# Patient Record
Sex: MALE | Race: BLACK OR AFRICAN AMERICAN | NOT HISPANIC OR LATINO | Employment: PART TIME | URBAN - METROPOLITAN AREA
[De-identification: names, ages, dates, MRNs, and addresses within clinical notes are randomized per-mention and may not be internally consistent; named-entity substitution may affect disease eponyms.]

---

## 2019-09-08 ENCOUNTER — HOSPITAL ENCOUNTER (EMERGENCY)
Facility: HOSPITAL | Age: 54
Discharge: HOME | End: 2019-09-08
Attending: EMERGENCY MEDICINE | Admitting: EMERGENCY MEDICINE
Payer: COMMERCIAL

## 2019-09-08 ENCOUNTER — APPOINTMENT (EMERGENCY)
Dept: CT IMAGING | Facility: HOSPITAL | Age: 54
End: 2019-09-08
Payer: COMMERCIAL

## 2019-09-08 VITALS
TEMPERATURE: 98 F | HEART RATE: 85 BPM | WEIGHT: 231.48 LBS | RESPIRATION RATE: 20 BRPM | BODY MASS INDEX: 27.1 KG/M2 | SYSTOLIC BLOOD PRESSURE: 138 MMHG | OXYGEN SATURATION: 98 % | DIASTOLIC BLOOD PRESSURE: 86 MMHG
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
## 2019-09-08 DIAGNOSIS — S16.1XXA STRAIN OF NECK MUSCLE, INITIAL ENCOUNTER: ICD-10-CM

## 2019-09-08 DIAGNOSIS — S09.90XA INJURY OF HEAD, INITIAL ENCOUNTER: ICD-10-CM

## 2019-09-08 PROCEDURE — 99284 EMERGENCY DEPT VISIT MOD MDM: CPT

## 2019-09-08 PROCEDURE — 70450 CT HEAD/BRAIN W/O DYE: CPT

## 2019-09-08 PROCEDURE — 99283 EMERGENCY DEPT VISIT LOW MDM: CPT | Performed by: EMERGENCY MEDICINE

## 2021-01-28 ENCOUNTER — DOCUMENTATION (OUTPATIENT)
Dept: AUDIOLOGY | Age: 56
End: 2021-01-28

## 2021-01-28 NOTE — PROGRESS NOTES
Progress Note    Name:  Rosanna Smyth  :  1965  Age:  63 y o    Date of Evaluation: 21     OVR PO received       Ardi Gilliland   Clinical Audiologist

## 2021-03-10 DIAGNOSIS — Z23 ENCOUNTER FOR IMMUNIZATION: ICD-10-CM

## 2021-03-24 ENCOUNTER — OFFICE VISIT (OUTPATIENT)
Dept: AUDIOLOGY | Age: 56
End: 2021-03-24
Payer: COMMERCIAL

## 2021-03-24 DIAGNOSIS — H90.3 SENSORY HEARING LOSS, BILATERAL: Primary | ICD-10-CM

## 2021-03-24 PROCEDURE — 92557 COMPREHENSIVE HEARING TEST: CPT | Performed by: AUDIOLOGIST-HEARING AID FITTER

## 2021-03-24 PROCEDURE — 92591 HB HEARING AID EXAM BOTH EARS: CPT | Performed by: AUDIOLOGIST-HEARING AID FITTER

## 2021-03-24 PROCEDURE — 92567 TYMPANOMETRY: CPT | Performed by: AUDIOLOGIST-HEARING AID FITTER

## 2021-03-24 NOTE — PROGRESS NOTES
HEARING EVALUATION    Name:  Vikki Johnson  :  1965  Age:  63 y o  Date of Evaluation: 21     History: Known Hearing Loss in the left ear only  Reason for visit: Vikki Johnson is being seen today at the request of Dr Zeyad Raphael for an evaluation of hearing  Patient reports a known significant hearing loss in his left ear that was caused by "vertigo" in  or   Bentley Helm reported he wore a hearing aid in his left ear starting in  but unfortunately lost in in   Denied tinnitus, aural fullness, and ear pain  EVALUATION:    Otoscopic Evaluation:   Right Ear: Clear and healthy ear canal and tympanic membrane   Left Ear: Clear and healthy ear canal and tympanic membrane    Tympanometry:   Right: Type A - normal middle ear pressure and compliance   Left: Type A - normal middle ear pressure and compliance    Audiogram Results:  Pure tone testing revealed a profound sensorineural hearing loss in the left ear and normal hearing sensitivity in the right ear  SRT and PTA are in agreement indicating good test reliability  Word discrimination ability was excellent in the right ear and very poor in the left ear (0%0  *see attached audiogram      RECOMMENDATIONS:  Annual hearing eval, Return to [facility]  for F/U, Medical Clearance and Copy to Patient/Caregiver    PATIENT EDUCATION:   Discussed results and recommendations with Ed  See HAE  Questions were addressed and the patient was encouraged to contact our department should concerns arise        Adri Hernandez   Clinical Audiologist

## 2021-03-24 NOTE — PROGRESS NOTES
Progress Note    Name:  Oscar Weinstein  :  1965  Age:  63 y o  Date of Evaluation: 21     Kaden Etienne was seen for an OVR HAE  Kaden Etienne reported he was previously wearing a custom hearing aid with all components inside of the ear, likely a CIC or IIC  Counseling was provided on the limitation of this type of hearing aid in regards to the severity of his hearing loss  Discussed options available such as a power BTE for sound awareness or a CROS system  Literature was provided on both options as well as extensive counseling  Kaden Etienne stated that he will email the department with his decision         Adri Reed   Clinical Audiologist

## 2021-04-19 NOTE — PROGRESS NOTES
Progress Note    Name:  Naomi Angelo  :  1965  Age:  63 y o  Date of Evaluation: 21     OVR-9 form sent to [facility] from [facility] chose [employer]         Adri Montoya   Clinical Audiologist

## 2021-05-20 NOTE — PROGRESS NOTES
Progress Note    Name:  Stevie Crum  :  1965  Age:  63 y o  Date of Evaluation: 21     OVR paperwork received         Adri Villaseñor , CCC-A  Clinical Audiologist

## 2021-05-26 NOTE — PROGRESS NOTES
Progress Note    Name:  Matthew Terrell  :  1965  Age:  63 y o  Date of Evaluation: 21     Hearing aids ordered, confirmation #[id number]  Ed can be scheduled for an HAP with Prasanth Munoz when they are in         Adri Rodriguez   Clinical Audiologist

## 2021-06-07 ENCOUNTER — OFFICE VISIT (OUTPATIENT)
Dept: AUDIOLOGY | Age: 56
End: 2021-06-07
Payer: COMMERCIAL

## 2021-06-07 DIAGNOSIS — H90.5 SENSORY HEARING LOSS, UNILATERAL: Primary | ICD-10-CM

## 2021-06-07 PROCEDURE — V5261 HEARING AID, DIGIT, BIN, BTE: HCPCS | Performed by: AUDIOLOGIST-HEARING AID FITTER

## 2021-06-07 PROCEDURE — V5160 DISPENSING FEE BINAURAL: HCPCS | Performed by: AUDIOLOGIST-HEARING AID FITTER

## 2021-06-07 NOTE — PROGRESS NOTES
Hearing Aid Fitting    Name:  Steve Oscar  :  1965  Age:  63 y o  Date of Evaluation: 21     Steve Oscar is being see today to be fit with new hearing aids  Patient is fit with Oticon Opn S 2 miniRITE & CROS miniRITE-T hearing aid(s)  Right serial number [id number]  Left serial number [id number]  Warranty date: 24 (Loss/Damage and repair)  Ear mold Battery  Dome   Right -- 312 2-85 10mm open   Left -- 312 2-85 10mm open     The hearing aid(s) were adjusted based on the patient's most recent audiogram and patient comfort  Care and cleaning of the hearing aids was reviewed  Domes, filters, and batteries and user manual were reviewed with the patient  The patient declined practicing insertion/removal in office  Hearing aids to be billed to [address]  Recommendation:   Return Adri Sahni   Clinical Audiologist

## 2021-08-03 ENCOUNTER — OFFICE VISIT (OUTPATIENT)
Dept: DENTISTRY | Facility: CLINIC | Age: 56
End: 2021-08-03

## 2021-08-03 DIAGNOSIS — Z01.20 ENCOUNTER FOR DENTAL EXAMINATION: Primary | ICD-10-CM

## 2021-08-03 PROCEDURE — D1110 PROPHYLAXIS - ADULT: HCPCS

## 2021-08-03 PROCEDURE — D1330 ORAL HYGIENE INSTRUCTIONS: HCPCS

## 2021-08-03 PROCEDURE — D0120 PERIODIC ORAL EVALUATION - ESTABLISHED PATIENT: HCPCS | Performed by: DENTIST

## 2021-08-03 NOTE — PATIENT INSTRUCTIONS
Mouth Care   WHAT YOU NEED TO KNOW:   Mouth care prevents infection, plaque, bleeding gums, mouth sores, and cavities  It also freshens breath and improves appetite  Do mouth care in the morning, after each meal, and before bed each night  You may need more frequent mouth care if your mouth is in poor condition  DISCHARGE INSTRUCTIONS:   Items used for mouth care:   · An electric or manual toothbrush    · Toothpaste, dental sticks, and floss    · A cup of water for rinsing    · Mouthwash     · Water-based lip balm or moisturizer    Brush your teeth:   · Wet the toothbrush and place a small amount of toothpaste on it  · Gently place the brush on each tooth, and move it in a [place]  Do not press too hard  This may injure your gums  · Clean the inner, outer, and top surfaces of your teeth  Brush your gums and the top of your tongue  · Swish the water in your mouth and spit it out  Repeat this step with mouthwash  · Dry around your mouth  Apply water-based lip balm or moisturizer to your lips to prevent cracking and dryness  Floss your teeth:  Thread the floss between each tooth, but do not push down on the gums too hard  This can cause gum damage  Make sure to floss on each side of every tooth  You may need to use waxed floss for easier movement between your teeth  Clean your dentures:  Remove the dentures from your mouth before you clean them  Moist dentures come out more easily  Drink a sip of water before you remove your dentures  Gently rock the dentures from side to side to loosen them  Then pull them out  · Brush the dentures with clean water and denture  or toothpaste  · Brush the dentures on all surfaces with cool water  Do not use hot water  Hot water could damage the dentures  Be careful not to bend any clasps on the dentures as you brush them  Rinse them  Place a thin layer of denture adhesive on the dentures and put them back in your mouth   Ask your healthcare provider which adhesive to use  · Soak the dentures in a denture solution each night after you brush them  Rinse them in cold water before you place them back in your mouth  Follow up with your healthcare provider or dentist every 6 months or as directed:  Write down your questions so you remember to ask them during your visits  Contact your healthcare provider or dentist if:   · You develop mouth sores  · Your dentures do not fit well  · You have pain with brushing  · You have questions or concerns about your condition or care  © Copyright [employer] 2021 Information is for End User's use only and may not be sold, redistributed or otherwise used for commercial purposes  All illustrations and images included in CareNotes® are the copyrighted property of A D A M , Inc  or Melvin Cabral  The above information is an  only  It is not intended as medical advice for individual conditions or treatments  Talk to your doctor, nurse or pharmacist before following any medical regimen to see if it is safe and effective for you

## 2021-08-03 NOTE — PROGRESS NOTES
Adult Prophy     Exams:  Periodic exam and spot probed - pt wanted to be out by 9:15 at latest and appt was 8:30  Xrays:     Dexis was not working  Type of Treatment:  Adult Prophy -  Hand scaling,  Polished, Flossed  Reviewed OHI  Brush:  2X/day and Floss 1X/day  Recommended Listerine  / ACT  mouth rinses  EO/OCS Exams:  No significant findings  IO: Large fistula on buccal of #32  Oral Hygiene:  Fair    Plaque: Moderate    Calculus:  Light    Bleeding:  Light    Gingiva: Inflamed in posterior regions  Stain:  Light   Periocharting was not completed  Caries Findings: Will assess after xrays   Treatment Plan:  Updated   Dr  Exam:  Dr Arlin Ramirez  Referral:  No referral given   NV:  4 BWX and Pas of #6 and 32/FM Probe  NVV:  6 MRC  ** Pt has PUD/PLD but needs adj on PUD w/Dr Syd Chavis - did not have partials with him    Pt also going to [facility] for RCT on tooth #32

## 2021-10-04 DIAGNOSIS — K05.219 PERIODONTAL ABSCESS: Primary | ICD-10-CM

## 2021-10-04 RX ORDER — AMOXICILLIN 500 MG/1
500 CAPSULE ORAL EVERY 8 HOURS SCHEDULED
Qty: 21 CAPSULE | Refills: 0 | Status: SHIPPED | OUTPATIENT
Start: 2021-10-04 | End: 2021-10-05 | Stop reason: CLARIF

## 2021-10-05 ENCOUNTER — OFFICE VISIT (OUTPATIENT)
Dept: DENTISTRY | Facility: CLINIC | Age: 56
End: 2021-10-05

## 2021-10-05 VITALS — TEMPERATURE: 96.6 F

## 2021-10-05 DIAGNOSIS — K05.219 PERIODONTAL ABSCESS: Primary | ICD-10-CM

## 2021-10-05 DIAGNOSIS — Z01.21 ENCOUNTER FOR DENTAL EXAMINATION AND CLEANING WITH ABNORMAL FINDINGS: Primary | ICD-10-CM

## 2021-10-05 PROCEDURE — D0274 BITEWINGS - 4 RADIOGRAPHIC IMAGES: HCPCS | Performed by: DENTIST

## 2021-10-05 PROCEDURE — D0220 INTRAORAL - PERIAPICAL FIRST RADIOGRAPHIC IMAGE: HCPCS | Performed by: DENTIST

## 2021-10-05 PROCEDURE — WIS5009 POST-OP DENTURE ADJUSTMENT: Performed by: DENTIST

## 2021-10-05 RX ORDER — AMOXICILLIN 500 MG/1
500 CAPSULE ORAL EVERY 8 HOURS SCHEDULED
Qty: 21 CAPSULE | Refills: 0 | Status: SHIPPED | OUTPATIENT
Start: 2021-10-05 | End: 2021-10-12

## 2021-10-13 DIAGNOSIS — Z76.89 REFERRAL OF PATIENT: Primary | ICD-10-CM

## 2021-10-13 DIAGNOSIS — K04.01 PULPITIS: ICD-10-CM

## 2021-10-18 ENCOUNTER — OFFICE VISIT (OUTPATIENT)
Dept: DENTISTRY | Facility: CLINIC | Age: 56
End: 2021-10-18

## 2021-10-18 VITALS — DIASTOLIC BLOOD PRESSURE: 94 MMHG | TEMPERATURE: 96.4 F | SYSTOLIC BLOOD PRESSURE: 135 MMHG | HEART RATE: 71 BPM

## 2021-10-18 DIAGNOSIS — K04.01 PULPITIS: Primary | ICD-10-CM

## 2021-10-18 PROCEDURE — D7140 EXTRACTION, ERUPTED TOOTH OR EXPOSED ROOT (ELEVATION AND/OR FORCEPS REMOVAL): HCPCS | Performed by: DENTIST

## 2021-12-20 ENCOUNTER — TELEPHONE (OUTPATIENT)
Dept: DENTISTRY | Facility: CLINIC | Age: 56
End: 2021-12-20

## 2022-01-05 ENCOUNTER — TELEPHONE (OUTPATIENT)
Dept: OTHER | Facility: OTHER | Age: 57
End: 2022-01-05

## 2022-01-05 NOTE — TELEPHONE ENCOUNTER
Patient called  He said his name is NOT Corrine Benedict and then proceeded to call me an idiot and he disconnected the call

## 2022-02-23 ENCOUNTER — OFFICE VISIT (OUTPATIENT)
Dept: DENTISTRY | Facility: CLINIC | Age: 57
End: 2022-02-23

## 2022-02-23 VITALS — SYSTOLIC BLOOD PRESSURE: 140 MMHG | DIASTOLIC BLOOD PRESSURE: 93 MMHG | TEMPERATURE: 97.3 F

## 2022-02-23 DIAGNOSIS — K02.62 DENTIN CARIES: ICD-10-CM

## 2022-02-23 DIAGNOSIS — Z01.20 ENCOUNTER FOR DENTAL EXAMINATION: Primary | ICD-10-CM

## 2022-02-23 NOTE — PROGRESS NOTES
Pt presents to clinic w/ chief complaint of my lower denture broke and "I chipped a tooth "    Medical history reviewed, no changes  BP WNL    Pt states his lower flexible base partial broke last daniel della  It fell out of his pocket while he was walking and he accidentally stepped on it  Denture is fractured in half, pt brought it with him to his appointment today  Pt also states his tooth in the lower right chipped a few months ago  #31 presents with a distal fracture w/ loss of restorative material  Tooth has a large existing composite  Not sensitive to cold or percussion  Explained to pt we will need to fabricate a new lower RPD  Pt has no interest in any material other than flexible base  Pt was given pros/cons of other options  Also discussed the need for a crown on tooth #30 due to the size of existing restoration and fracture  We need to complete the crown before fabricating a new RPD  Pt understands and would like to proceed with #30 crown  Per Dr Amie Godinez, we can re-make the valplast denture at no charge for pt  He just needs to pay his existing balance on his original valplast RPD  Pt understands  Pt is concerned about appointment availability  Recommended he get on the cancellation list      Nv: Build up/Crown prep/impression #30  Please take new BW/PA of #30 before prepping     Nvv: Delivery of #30 ceramic crown and impression for valplast denture

## 2022-04-01 ENCOUNTER — OFFICE VISIT (OUTPATIENT)
Dept: DENTISTRY | Facility: CLINIC | Age: 57
End: 2022-04-01

## 2022-04-01 VITALS — SYSTOLIC BLOOD PRESSURE: 136 MMHG | HEART RATE: 88 BPM | DIASTOLIC BLOOD PRESSURE: 94 MMHG | TEMPERATURE: 96.4 F

## 2022-04-01 DIAGNOSIS — K02.62 DENTIN CARIES: Primary | ICD-10-CM

## 2022-04-01 PROCEDURE — WIS2000 CROWN PREP: Performed by: DENTIST

## 2022-04-01 NOTE — PROGRESS NOTES
Krupa Smith presents for crown prep #31  PMH reviewed, no changes  The radiograph shows that tooth #30 is missing and tooth #31 has mesial drifted to be in contact with tooth #29  Fabricated bite matrix with bluprint and triple tray  Applied topical benzocaine, administered carps 2% lido 1:100k epi via MEGHNA block carps 4% articaine 1:100k epi via local infiltration  Tooth prepped with reductions for PFZ  Made provisional crown with provisa and matrix, refined with lio on high speed  Confirmed provisional covers margins with no overhangs  Cemented provisional crown using IRM  Removed excess , occlusion and contacts verified  Pt informed to bring RPD to next appointment  Pt left satisfied and ambulatory    ?  NV: Refine crown prep and final impressions of tooth #31    Attending: Dr Susan Nuno

## 2022-04-07 ENCOUNTER — OFFICE VISIT (OUTPATIENT)
Dept: DENTISTRY | Facility: CLINIC | Age: 57
End: 2022-04-07

## 2022-04-07 VITALS — SYSTOLIC BLOOD PRESSURE: 147 MMHG | TEMPERATURE: 97.2 F | DIASTOLIC BLOOD PRESSURE: 96 MMHG

## 2022-04-07 DIAGNOSIS — Z01.20 ENCOUNTER FOR DENTAL EXAMINATION: ICD-10-CM

## 2022-04-07 DIAGNOSIS — Z01.21 ENCOUNTER FOR DENTAL EXAMINATION AND CLEANING WITH ABNORMAL FINDINGS: Primary | ICD-10-CM

## 2022-04-07 PROCEDURE — WIS2000 CROWN PREP: Performed by: DENTIST

## 2022-04-07 NOTE — PROGRESS NOTES
Crown Prep    Todd Fiona presents for refining of crown prep #31 and temporize  PMH reviewed, no changes  ASA II  Pain score 0    Applied topical benzocaine, administered 1 carp 2% lido 1:100k epi via MEGHNA and lingual block 1/2 carp 4% articaine 1:100k epi via local infiltration  Tooth prepped with reductions for PFZ  Made provisional crown with provisa and matrix, refined with lio on high speed  Confirmed provisional covers margins with no overhangs  Cemented provisional crown using Durelon  Removed excess cement, occlusion and contacts verified  Post op instructions given  Pt left satisfied and ambulatory  ?    NV: Final impression #31 (60 mins) Please schedule no sooner than 2 weeks  Nv2: Via Tawanna Brito and prophy  Attending: Dr Delmy Lira

## 2022-04-29 ENCOUNTER — OFFICE VISIT (OUTPATIENT)
Dept: DENTISTRY | Facility: CLINIC | Age: 57
End: 2022-04-29

## 2022-04-29 VITALS — TEMPERATURE: 97.7 F | SYSTOLIC BLOOD PRESSURE: 137 MMHG | HEART RATE: 86 BPM | DIASTOLIC BLOOD PRESSURE: 96 MMHG

## 2022-04-29 DIAGNOSIS — Z01.20 ENCOUNTER FOR DENTAL EXAMINATION: Primary | ICD-10-CM

## 2022-04-29 PROCEDURE — D0120 PERIODIC ORAL EVALUATION - ESTABLISHED PATIENT: HCPCS | Performed by: DENTAL HYGIENIST

## 2022-04-29 PROCEDURE — D1110 PROPHYLAXIS - ADULT: HCPCS | Performed by: DENTAL HYGIENIST

## 2022-04-29 NOTE — PROGRESS NOTES
Dental procedures in this visit     - PERIODIC ORAL EVALUATION - ESTABLISHED PATIENT (Completed)     Service provider: Geno Saba DDS     Billing provider: Alcira Corrales DDS     - PROPHYLAXIS - ADULT (Completed)     Service provider: Jolie Manzo 195     Billing provider: Alcira Corrales DDS     Subjective   Patient ID: Julieta Simmonds is a 61 y o  male  Chief Complaint   Patient presents with    Routine Oral Cleaning    Periodic Exam     ASA  II;  Reviewed M/DH    Adult Prophy     Exams:  Periodic exam and spot probed  Xrays:     none  Type of Treatment:  Adult Prophy -  Hand scaling,  Polished, Flossed  Reviewed OHI  Brush:  2X/day and Floss 1X/day  Recommended Listerine  / ACT  mouth rinses  EO/OCS Exams:  No significant findings  IO: No significant findings  Oral Hygiene:  Good   Plaque:  Light    Calculus:  Light   Bleeding:  Light    Gingiva:  Pink  / Firm  /;    Red   /  Inflamed between  17, 18  Stain:  Light  / Moderate  /Heavy  Perio Charting:  Periocharting was completed and evaluated  5 mm pockets between 17, 18  w/ light BUP;   Otherwise 1-3 mm all other areas     Perio Findings:  Mild gingivitis localized  Caries Findings:  Crowns to do  Caries Risk Assessment:     Moderate  caries risk  Treatment plan:    Not updated  Dr  Exam:  Dr Jewell Mclean  Referral:  No referral given   NV1:  Final imp #31 - 60 min  Nv2:   6mrc w/ BW's - 60 min

## 2022-05-03 ENCOUNTER — TELEPHONE (OUTPATIENT)
Dept: DERMATOLOGY | Facility: CLINIC | Age: 57
End: 2022-05-03

## 2022-05-07 ENCOUNTER — APPOINTMENT (EMERGENCY)
Dept: RADIOLOGY | Facility: HOSPITAL | Age: 57
End: 2022-05-07
Payer: MEDICARE

## 2022-05-07 ENCOUNTER — APPOINTMENT (EMERGENCY)
Dept: CT IMAGING | Facility: HOSPITAL | Age: 57
End: 2022-05-07
Payer: MEDICARE

## 2022-05-07 ENCOUNTER — HOSPITAL ENCOUNTER (EMERGENCY)
Facility: HOSPITAL | Age: 57
Discharge: HOME | End: 2022-05-07
Attending: EMERGENCY MEDICINE
Payer: MEDICARE

## 2022-05-07 VITALS
SYSTOLIC BLOOD PRESSURE: 135 MMHG | DIASTOLIC BLOOD PRESSURE: 91 MMHG | TEMPERATURE: 98.4 F | HEART RATE: 80 BPM | RESPIRATION RATE: 14 BRPM | BODY MASS INDEX: 28.4 KG/M2 | WEIGHT: 242.6 LBS | OXYGEN SATURATION: 97 %

## 2022-05-07 DIAGNOSIS — R10.9 ABDOMINAL PAIN: ICD-10-CM

## 2022-05-07 DIAGNOSIS — R07.9 CHEST PAIN: Primary | ICD-10-CM

## 2022-05-07 DIAGNOSIS — M25.512 LEFT SHOULDER PAIN: ICD-10-CM

## 2022-05-07 LAB
ALBUMIN SERPL BCP-MCNC: 3.9 G/DL (ref 3.5–5)
ALP SERPL-CCNC: 64 U/L (ref 46–116)
ALT SERPL W P-5'-P-CCNC: 40 U/L (ref 12–78)
ANION GAP SERPL CALCULATED.3IONS-SCNC: 8 MMOL/L (ref 4–13)
AST SERPL W P-5'-P-CCNC: 22 U/L (ref 5–45)
ATRIAL RATE: 59 BPM
ATRIAL RATE: 83 BPM
BASOPHILS # BLD AUTO: 0.01 THOUSANDS/ΜL (ref 0–0.1)
BASOPHILS NFR BLD AUTO: 1 % (ref 0–1)
BILIRUB SERPL-MCNC: 0.55 MG/DL (ref 0.2–1)
BILIRUB UR QL STRIP: NEGATIVE
BUN SERPL-MCNC: 12 MG/DL (ref 5–25)
CALCIUM SERPL-MCNC: 9.1 MG/DL (ref 8.3–10.1)
CARDIAC TROPONIN I PNL SERPL HS: 3 NG/L
CHLORIDE SERPL-SCNC: 98 MMOL/L (ref 100–108)
CLARITY UR: CLEAR
CO2 SERPL-SCNC: 28 MMOL/L (ref 21–32)
COLOR UR: YELLOW
CREAT SERPL-MCNC: 1.04 MG/DL (ref 0.6–1.3)
D DIMER PPP FEU-MCNC: <0.27 UG/ML FEU
EOSINOPHIL # BLD AUTO: 0.04 THOUSAND/ΜL (ref 0–0.61)
EOSINOPHIL NFR BLD AUTO: 2 % (ref 0–6)
ERYTHROCYTE [DISTWIDTH] IN BLOOD BY AUTOMATED COUNT: 13.7 % (ref 11.6–15.1)
GFR SERPL CREATININE-BSD FRML MDRD: 79 ML/MIN/1.73SQ M
GLUCOSE SERPL-MCNC: 106 MG/DL (ref 65–140)
GLUCOSE UR STRIP-MCNC: NEGATIVE MG/DL
HCT VFR BLD AUTO: 44.9 % (ref 36.5–49.3)
HGB BLD-MCNC: 15.3 G/DL (ref 12–17)
HGB UR QL STRIP.AUTO: NEGATIVE
IMM GRANULOCYTES # BLD AUTO: 0 THOUSAND/UL (ref 0–0.2)
IMM GRANULOCYTES NFR BLD AUTO: 0 % (ref 0–2)
KETONES UR STRIP-MCNC: NEGATIVE MG/DL
LEUKOCYTE ESTERASE UR QL STRIP: NEGATIVE
LIPASE SERPL-CCNC: 71 U/L (ref 73–393)
LYMPHOCYTES # BLD AUTO: 0.5 THOUSANDS/ΜL (ref 0.6–4.47)
LYMPHOCYTES NFR BLD AUTO: 25 % (ref 14–44)
MCH RBC QN AUTO: 26.3 PG (ref 26.8–34.3)
MCHC RBC AUTO-ENTMCNC: 34.1 G/DL (ref 31.4–37.4)
MCV RBC AUTO: 77 FL (ref 82–98)
MONOCYTES # BLD AUTO: 0.34 THOUSAND/ΜL (ref 0.17–1.22)
MONOCYTES NFR BLD AUTO: 17 % (ref 4–12)
NEUTROPHILS # BLD AUTO: 1.15 THOUSANDS/ΜL (ref 1.85–7.62)
NEUTS SEG NFR BLD AUTO: 55 % (ref 43–75)
NITRITE UR QL STRIP: NEGATIVE
NRBC BLD AUTO-RTO: 0 /100 WBCS
P AXIS: 43 DEGREES
P AXIS: 45 DEGREES
PH UR STRIP.AUTO: 6 [PH] (ref 4.5–8)
PLATELET # BLD AUTO: 232 THOUSANDS/UL (ref 149–390)
PMV BLD AUTO: 9.3 FL (ref 8.9–12.7)
POTASSIUM SERPL-SCNC: 4.1 MMOL/L (ref 3.5–5.3)
PR INTERVAL: 166 MS
PROT SERPL-MCNC: 7.9 G/DL (ref 6.4–8.2)
PROT UR STRIP-MCNC: NEGATIVE MG/DL
QRS AXIS: 12 DEGREES
QRS AXIS: 7 DEGREES
QRSD INTERVAL: 96 MS
QRSD INTERVAL: 98 MS
QT INTERVAL: 356 MS
QT INTERVAL: 358 MS
QTC INTERVAL: 420 MS
QTC INTERVAL: 442 MS
RBC # BLD AUTO: 5.82 MILLION/UL (ref 3.88–5.62)
SODIUM SERPL-SCNC: 134 MMOL/L (ref 136–145)
SP GR UR STRIP.AUTO: 1.01 (ref 1–1.03)
T WAVE AXIS: 30 DEGREES
T WAVE AXIS: 31 DEGREES
UROBILINOGEN UR QL STRIP.AUTO: 0.2 E.U./DL
VENTRICULAR RATE: 83 BPM
VENTRICULAR RATE: 93 BPM
WBC # BLD AUTO: 2.04 THOUSAND/UL (ref 4.31–10.16)

## 2022-05-07 PROCEDURE — 93010 ELECTROCARDIOGRAM REPORT: CPT | Performed by: INTERNAL MEDICINE

## 2022-05-07 PROCEDURE — 99285 EMERGENCY DEPT VISIT HI MDM: CPT | Performed by: PHYSICIAN ASSISTANT

## 2022-05-07 PROCEDURE — 74176 CT ABD & PELVIS W/O CONTRAST: CPT

## 2022-05-07 PROCEDURE — 93005 ELECTROCARDIOGRAM TRACING: CPT

## 2022-05-07 PROCEDURE — 80053 COMPREHEN METABOLIC PANEL: CPT | Performed by: PHYSICIAN ASSISTANT

## 2022-05-07 PROCEDURE — 85379 FIBRIN DEGRADATION QUANT: CPT | Performed by: PHYSICIAN ASSISTANT

## 2022-05-07 PROCEDURE — 36415 COLL VENOUS BLD VENIPUNCTURE: CPT | Performed by: PHYSICIAN ASSISTANT

## 2022-05-07 PROCEDURE — 85025 COMPLETE CBC W/AUTO DIFF WBC: CPT | Performed by: PHYSICIAN ASSISTANT

## 2022-05-07 PROCEDURE — 71045 X-RAY EXAM CHEST 1 VIEW: CPT

## 2022-05-07 PROCEDURE — 84484 ASSAY OF TROPONIN QUANT: CPT | Performed by: PHYSICIAN ASSISTANT

## 2022-05-07 PROCEDURE — 81003 URINALYSIS AUTO W/O SCOPE: CPT

## 2022-05-07 PROCEDURE — 99285 EMERGENCY DEPT VISIT HI MDM: CPT

## 2022-05-07 PROCEDURE — 83690 ASSAY OF LIPASE: CPT | Performed by: PHYSICIAN ASSISTANT

## 2022-05-07 RX ORDER — FAMOTIDINE 20 MG/1
20 TABLET, FILM COATED ORAL ONCE
Status: COMPLETED | OUTPATIENT
Start: 2022-05-07 | End: 2022-05-07

## 2022-05-07 RX ORDER — FAMOTIDINE 20 MG/1
20 TABLET, FILM COATED ORAL 2 TIMES DAILY
Qty: 20 TABLET | Refills: 0 | Status: SHIPPED | OUTPATIENT
Start: 2022-05-07 | End: 2022-05-17

## 2022-05-07 RX ORDER — MAGNESIUM HYDROXIDE/ALUMINUM HYDROXICE/SIMETHICONE 120; 1200; 1200 MG/30ML; MG/30ML; MG/30ML
30 SUSPENSION ORAL ONCE
Status: COMPLETED | OUTPATIENT
Start: 2022-05-07 | End: 2022-05-07

## 2022-05-07 RX ORDER — SUCRALFATE 1 G/1
1 TABLET ORAL 4 TIMES DAILY
Qty: 40 TABLET | Refills: 0 | Status: SHIPPED | OUTPATIENT
Start: 2022-05-07 | End: 2022-05-17

## 2022-05-07 RX ORDER — SUCRALFATE 1 G/1
1 TABLET ORAL 4 TIMES DAILY
Qty: 40 TABLET | Refills: 0 | Status: SHIPPED | OUTPATIENT
Start: 2022-05-07 | End: 2022-05-07 | Stop reason: SDUPTHER

## 2022-05-07 RX ORDER — FAMOTIDINE 20 MG/1
20 TABLET, FILM COATED ORAL 2 TIMES DAILY
Qty: 20 TABLET | Refills: 0 | Status: SHIPPED | OUTPATIENT
Start: 2022-05-07 | End: 2022-05-07 | Stop reason: SDUPTHER

## 2022-05-07 RX ORDER — FAMOTIDINE 10 MG/ML
20 INJECTION, SOLUTION INTRAVENOUS ONCE
Status: DISCONTINUED | OUTPATIENT
Start: 2022-05-07 | End: 2022-05-07

## 2022-05-07 RX ADMIN — FAMOTIDINE 20 MG: 20 TABLET, FILM COATED ORAL at 16:00

## 2022-05-07 RX ADMIN — ALUMINUM HYDROXIDE, MAGNESIUM HYDROXIDE, AND SIMETHICONE 30 ML: 200; 200; 20 SUSPENSION ORAL at 16:01

## 2022-05-09 NOTE — ED PROVIDER NOTES
History  Chief Complaint   Patient presents with    Chest Pain     pt reports left sided chest pain that has been intermittent for 12 hours  Sherie Olson is a 62 yo M presenting with left sided chest pain which has been present for about the past 12 hours, but has occurred intermittently over the past 1-2 weeks  The pain is well localized and without significant associated dyspnea  He notes the pain feels like a "pressure"  He also notes some pain to anterior L shoulder, but reports this feels separate, worsens with movement, and was associated with recent heavy lifting  Denies radiation of pain to neck, back  Denies pleuritic nature to pain  Denies worsening of pain with exertion/activity  He reports he was able to work out in the past few days without any exacerbation of pain  No coughing/wheezing  No fevers/chills  No specific positions make the pain better or worse  He does note remote history of PE for which he is still on Eliquis which he reports being compliant with  He also notes intermittent upper abdominal pain over the past day  He notes some associated nausea without vomiting  The pain is primarily to epigastrium and LUQ of abdomen  He notes it does seem to worsen with eating  No vomiting  No diarrhea, constipation, or melena  Denies alcohol or illicit substance use  History provided by:  Patient   used: No        None       Past Medical History:   Diagnosis Date    Left ear hearing loss     Lymphedema     Pulmonary embolism ([facility] [address] )     Sarcoidosis        History reviewed  No pertinent surgical history  History reviewed  No pertinent family history  I have reviewed and agree with the history as documented      E-Cigarette/Vaping     E-Cigarette/Vaping Substances     Social History     Tobacco Use    Smoking status: Never Smoker    Smokeless tobacco: Never Used   Substance Use Topics    Alcohol use: Not Currently    Drug use: Not on file       Review of Systems Constitutional: Negative for chills and fever  HENT: Negative for congestion, rhinorrhea and sore throat  Eyes: Negative for pain and visual disturbance  Respiratory: Negative for cough, shortness of breath and wheezing  Cardiovascular: Positive for chest pain  Negative for palpitations and leg swelling  Gastrointestinal: Positive for abdominal pain and nausea  Negative for constipation, diarrhea and vomiting  Genitourinary: Negative for dysuria, frequency and urgency  Musculoskeletal: Positive for arthralgias  Negative for back pain, neck pain and neck stiffness  Skin: Negative for rash and wound  Neurological: Negative for dizziness, weakness, light-headedness and numbness  Physical Exam  Physical Exam  Constitutional:       General: He is not in acute distress  Appearance: He is well-developed  He is not toxic-appearing or diaphoretic  HENT:      Head: Normocephalic and atraumatic  Right Ear: External ear normal       Left Ear: External ear normal    Eyes:      Conjunctiva/sclera: Conjunctivae normal       Pupils: Pupils are equal, round, and reactive to light  Cardiovascular:      Rate and Rhythm: Normal rate and regular rhythm  Heart sounds: Normal heart sounds  No murmur heard  No friction rub  No gallop  Comments: No lower extremity edema or calf TTP  Negative Bria's b/l  Pulmonary:      Effort: Pulmonary effort is normal  No respiratory distress  Breath sounds: Normal breath sounds  No wheezing, rhonchi or rales  Comments: TTP to L anterior chest wall just lateral to sternum somewhat reproducing patient's chest pain  No overlying rashes/lesions  Chest:      Chest wall: Tenderness present  Abdominal:      General: There is no distension  Palpations: Abdomen is soft  Tenderness: There is abdominal tenderness  There is no right CVA tenderness, left CVA tenderness or guarding  Comments: Mild epigastric and LUQ abd TTP  No RUQ TTP  No rigidity, rebound, or guarding  Neg Lazo's  No CVAT  Musculoskeletal:      Cervical back: Normal range of motion and neck supple  Lymphadenopathy:      Cervical: No cervical adenopathy  Skin:     General: Skin is warm and dry  Capillary Refill: Capillary refill takes less than 2 seconds  Findings: No erythema or rash  Neurological:      Mental Status: He is alert and oriented to person, place, and time  Motor: No abnormal muscle tone  Coordination: Coordination normal    Psychiatric:         Behavior: Behavior normal          Thought Content:  Thought content normal          Judgment: Judgment normal          Vital Signs  ED Triage Vitals   Temperature Pulse Respirations Blood Pressure SpO2   05/07/22 1316 05/07/22 1313 05/07/22 1313 05/07/22 1315 05/07/22 1313   98 4 °F (36 9 °C) 88 18 140/85 99 %      Temp Source Heart Rate Source Patient Position - Orthostatic VS BP Location FiO2 (%)   05/07/22 1316 05/07/22 1313 -- -- --   Oral Monitor         Pain Score       --                  Vitals:    05/07/22 1313 05/07/22 1315 05/07/22 1607 05/07/22 1702   BP:  140/85 (!) 139/101 135/91   Pulse: 88  80          Visual Acuity      ED Medications  Medications   aluminum-magnesium hydroxide-simethicone (MYLANTA) oral suspension 30 mL (30 mL Oral Given 5/7/22 1601)   famotidine (PEPCID) tablet 20 mg (20 mg Oral Given 5/7/22 1600)       Diagnostic Studies  Results Reviewed     Procedure Component Value Units Date/Time    CBC and differential [[id number]]  (Abnormal) Collected: 05/07/22 1601    Lab Status: Final result Specimen: Blood from Arm, Right Updated: 05/07/22 1612     WBC 2 04 Thousand/uL      RBC 5 82 Million/uL      Hemoglobin 15 3 g/dL      Hematocrit 44 9 %      MCV 77 fL      MCH 26 3 pg      MCHC 34 1 g/dL      RDW 13 7 %      MPV 9 3 fL      Platelets 610 Thousands/uL      nRBC 0 /100 WBCs      Neutrophils Relative 55 %      Immat GRANS % 0 %      Lymphocytes Relative 25 % Monocytes Relative 17 %      Eosinophils Relative 2 %      Basophils Relative 1 %      Neutrophils Absolute 1 15 Thousands/µL      Immature Grans Absolute 0 00 Thousand/uL      Lymphocytes Absolute 0 50 Thousands/µL      Monocytes Absolute 0 34 Thousand/µL      Eosinophils Absolute 0 04 Thousand/µL      Basophils Absolute 0 01 Thousands/µL     D-dimer, quantitative [[id number]]  (Normal) Collected: 05/07/22 1453    Lab Status: Final result Specimen: Blood from Arm, Right Updated: 05/07/22 1542     D-Dimer, Quant <0 27 ug/ml FEU     Narrative: In the evaluation for possible pulmonary embolism, in the appropriate (Well's Score of 4 or less) patient, the age adjusted d-dimer cutoff for this patient can be calculated as:    Age x 0 01 (in ug/mL) for Age-adjusted D-dimer exclusion threshold for a patient over 50 years      HS Troponin 0hr (reflex protocol) [[id number]]  (Normal) Collected: 05/07/22 1451    Lab Status: Final result Specimen: Blood from Arm, Right Updated: 05/07/22 1529     hs TnI 0hr 3 ng/L     Comprehensive metabolic panel [[id number]]  (Abnormal) Collected: 05/07/22 1453    Lab Status: Final result Specimen: Blood from Arm, Right Updated: 05/07/22 1523     Sodium 134 mmol/L      Potassium 4 1 mmol/L      Chloride 98 mmol/L      CO2 28 mmol/L      ANION GAP 8 mmol/L      BUN 12 mg/dL      Creatinine 1 04 mg/dL      Glucose 106 mg/dL      Calcium 9 1 mg/dL      AST 22 U/L      ALT 40 U/L      Alkaline Phosphatase 64 U/L      Total Protein 7 9 g/dL      Albumin 3 9 g/dL      Total Bilirubin 0 55 mg/dL      eGFR 79 ml/min/1 73sq m     Narrative:      [facility] guidelines for Chronic Kidney Disease (CKD):     Stage 1 with normal or high GFR (GFR > 90 mL/min/1 73 square meters)    Stage 2 Mild CKD (GFR = 60-89 mL/min/1 73 square meters)    Stage 3A Moderate CKD (GFR = 45-59 mL/min/1 73 square meters)    Stage 3B Moderate CKD (GFR = 30-44 mL/min/1 73 square meters)    Stage 4 Severe CKD (GFR = 15-29 mL/min/1 73 square meters)    Stage 5 End Stage CKD (GFR <15 mL/min/1 73 square meters)  Note: GFR calculation is accurate only with a steady state creatinine    Lipase [[id number]]  (Abnormal) Collected: 05/07/22 1453    Lab Status: Final result Specimen: Blood from Arm, Right Updated: 05/07/22 1523     Lipase 71 u/L     Urine Macroscopic, POC [[id number]] Collected: 05/07/22 1430    Lab Status: Final result Specimen: Urine Updated: 05/07/22 1431     Color, UA Yellow     Clarity, UA Clear     pH, UA 6 0     Leukocytes, UA Negative     Nitrite, UA Negative     Protein, UA Negative mg/dl      Glucose, UA Negative mg/dl      Ketones, UA Negative mg/dl      Urobilinogen, UA 0 2 E U /dl      Bilirubin, UA Negative     Blood, UA Negative     Specific Gravity, UA 1 010    Narrative:      CLINITEK RESULT                 CT abdomen pelvis wo contrast   Final Result by Yoanna Wild MD (05/07 1639)      No acute abnormalities  Workstation performed: [id number]         XR chest 1 view portable   Final Result by Yoanna Wild MD (05/07 1640)   No acute cardiopulmonary findings                     Workstation performed: [id number]                    Procedures  ECG 12 Lead Documentation Only    Date/Time: 5/7/2022 1:42 PM  Performed by: Betina Hickman PA-C  Authorized by: Betina Hickman PA-C     Indications / Diagnosis:  Chest pain  ECG reviewed by me, the ED Provider: yes    Patient location:  ED  Previous ECG:     Previous ECG:  Compared to current    Similarity:  No change    Comparison to cardiac monitor: Yes    Interpretation:     Interpretation: non-specific    Rate:     ECG rate:  83    ECG rate assessment: normal    Rhythm:     Rhythm: sinus rhythm    QRS:     QRS axis:  Normal    QRS intervals:  Normal  Conduction:     Conduction: abnormal      Abnormal conduction: incomplete RBBB    ST segments:     ST segments:  Normal  T waves:     T waves: normal               ED Course                               SBIRT 22yo+      Most Recent Value   SBIRT (24 yo +)    In order to provide better care to our patients, we are screening all of our patients for alcohol and drug use  Would it be okay to ask you these screening questions? No Filed at: 05/07/2022 5003                    [facility]  Number of Diagnoses or Management Options  Abdominal pain  Chest pain  Left shoulder pain  Diagnosis management comments: L sided chest pain, intermittent over the past weeks but constant since yesterday evening  Non-exertional, non-pleuritic, and worsens with palpation  Does note recent work out with heavy lifting  Notes L anterior shoulder discomfort with movement as well but this appears msk  No exam findings to suggest DVT, does take Eliquis for prior PE  EKG shows NSR, incomplete RBBB, grossly unchanged from previous  Also notes upper abd pain, nausea over the past day, worse with eating  TTP to epigastrium/LUQ without peritoneal signs  Low risk for cardiac etiology given history/exam  Noted to have had normal Will check labs including CBC for anemia, CMP for electrolytes and LFT's, lipase for pancreatitis, troponin for cardiac ischemia, d-dimer for exclusion of PE  XR chest for infiltrate  Plan for CT abd/pelvis, +/- CTA PE study pending d-dimer  GI cocktail  Disposition pending ED workup  Disposition  Final diagnoses:   Chest pain   Left shoulder pain   Abdominal pain     Time reflects when diagnosis was documented in both MDM as applicable and the Disposition within this note     Time User Action Codes Description Comment    5/7/2022  5:34 PM Elenor Bald Add [R07 9] Chest pain     5/7/2022  5:34 PM Elenor Bald Add [M25 512] Left shoulder pain     5/7/2022  5:34 PM Elenor Bald Add [R10 9] Abdominal pain       ED Disposition     ED Disposition Condition Date/Time Comment    Discharge Stable Sat May 7, 2022  5:34 PM Ed Reyes Dey  discharge to home/self care  Follow-up Information     Follow up With Specialties Details Why Contact Info Additional [address], DO Internal Medicine Schedule an appointment as soon as possible for a visit   [place] [facility] [address] [place] [phone] [address] [facility] Emergency Department Emergency Medicine  If symptoms worsen [facility] [phone] [address] [facility] Emergency Department, [address] [place], [place], [address]          Discharge Medication List as of 5/7/2022  5:36 PM      START taking these medications    Details   famotidine (PEPCID) 20 mg tablet Take 1 tablet (20 mg total) by mouth 2 (two) times a day for 10 days, Starting Sat 5/7/2022, Until Tue 5/17/2022, Normal      sucralfate (CARAFATE) 1 g tablet Take 1 tablet (1 g total) by mouth 4 (four) times a day for 10 days, Starting Sat 5/7/2022, Until Tue 5/17/2022, Normal             No discharge procedures on file      PDMP Review     None          ED Provider  Electronically Signed by           Lisa Terry PA-C  05/09/22 8553

## 2022-05-11 ENCOUNTER — HOSPITAL ENCOUNTER (EMERGENCY)
Facility: HOSPITAL | Age: 57
Discharge: HOME | End: 2022-05-11
Attending: EMERGENCY MEDICINE | Admitting: EMERGENCY MEDICINE
Payer: MEDICARE

## 2022-05-11 ENCOUNTER — APPOINTMENT (EMERGENCY)
Dept: CT IMAGING | Facility: HOSPITAL | Age: 57
End: 2022-05-11
Payer: MEDICARE

## 2022-05-11 VITALS
SYSTOLIC BLOOD PRESSURE: 129 MMHG | DIASTOLIC BLOOD PRESSURE: 82 MMHG | BODY MASS INDEX: 27.84 KG/M2 | TEMPERATURE: 98.4 F | HEART RATE: 70 BPM | WEIGHT: 237.8 LBS | OXYGEN SATURATION: 96 % | RESPIRATION RATE: 16 BRPM

## 2022-05-11 DIAGNOSIS — R10.9 LEFT-SIDED ABDOMINAL PAIN OF UNKNOWN ETIOLOGY: Primary | ICD-10-CM

## 2022-05-11 DIAGNOSIS — D72.819 LEUKOPENIA: ICD-10-CM

## 2022-05-11 LAB
ALBUMIN SERPL BCP-MCNC: 3.9 G/DL (ref 3.5–5)
ALP SERPL-CCNC: 60 U/L (ref 46–116)
ALT SERPL W P-5'-P-CCNC: 29 U/L (ref 12–78)
ANION GAP SERPL CALCULATED.3IONS-SCNC: 6 MMOL/L (ref 4–13)
AST SERPL W P-5'-P-CCNC: 19 U/L (ref 5–45)
BACTERIA UR QL AUTO: ABNORMAL /HPF
BASOPHILS # BLD AUTO: 0.01 THOUSANDS/ΜL (ref 0–0.1)
BASOPHILS NFR BLD AUTO: 0 % (ref 0–1)
BILIRUB SERPL-MCNC: 0.61 MG/DL (ref 0.2–1)
BILIRUB UR QL STRIP: NEGATIVE
BUN SERPL-MCNC: 12 MG/DL (ref 5–25)
CALCIUM SERPL-MCNC: 8.5 MG/DL (ref 8.3–10.1)
CHLORIDE SERPL-SCNC: 99 MMOL/L (ref 100–108)
CLARITY UR: CLEAR
CO2 SERPL-SCNC: 30 MMOL/L (ref 21–32)
COLOR UR: YELLOW
CREAT SERPL-MCNC: 0.99 MG/DL (ref 0.6–1.3)
D DIMER PPP FEU-MCNC: <0.27 UG/ML FEU
EOSINOPHIL # BLD AUTO: 0.05 THOUSAND/ΜL (ref 0–0.61)
EOSINOPHIL NFR BLD AUTO: 2 % (ref 0–6)
ERYTHROCYTE [DISTWIDTH] IN BLOOD BY AUTOMATED COUNT: 13.6 % (ref 11.6–15.1)
GFR SERPL CREATININE-BSD FRML MDRD: 84 ML/MIN/1.73SQ M
GLUCOSE SERPL-MCNC: 89 MG/DL (ref 65–140)
GLUCOSE UR STRIP-MCNC: NEGATIVE MG/DL
HCT VFR BLD AUTO: 43.8 % (ref 36.5–49.3)
HGB BLD-MCNC: 14.8 G/DL (ref 12–17)
HGB UR QL STRIP.AUTO: ABNORMAL
IMM GRANULOCYTES # BLD AUTO: 0 THOUSAND/UL (ref 0–0.2)
IMM GRANULOCYTES NFR BLD AUTO: 0 % (ref 0–2)
KETONES UR STRIP-MCNC: NEGATIVE MG/DL
LEUKOCYTE ESTERASE UR QL STRIP: NEGATIVE
LIPASE SERPL-CCNC: 77 U/L (ref 73–393)
LYMPHOCYTES # BLD AUTO: 0.66 THOUSANDS/ΜL (ref 0.6–4.47)
LYMPHOCYTES NFR BLD AUTO: 29 % (ref 14–44)
MCH RBC QN AUTO: 26.9 PG (ref 26.8–34.3)
MCHC RBC AUTO-ENTMCNC: 33.8 G/DL (ref 31.4–37.4)
MCV RBC AUTO: 80 FL (ref 82–98)
MONOCYTES # BLD AUTO: 0.33 THOUSAND/ΜL (ref 0.17–1.22)
MONOCYTES NFR BLD AUTO: 15 % (ref 4–12)
MUCOUS THREADS UR QL AUTO: ABNORMAL
NEUTROPHILS # BLD AUTO: 1.2 THOUSANDS/ΜL (ref 1.85–7.62)
NEUTS SEG NFR BLD AUTO: 54 % (ref 43–75)
NITRITE UR QL STRIP: NEGATIVE
NON-SQ EPI CELLS URNS QL MICRO: ABNORMAL /HPF
NRBC BLD AUTO-RTO: 0 /100 WBCS
PH UR STRIP.AUTO: 6 [PH] (ref 4.5–8)
PLATELET # BLD AUTO: 229 THOUSANDS/UL (ref 149–390)
PMV BLD AUTO: 8.9 FL (ref 8.9–12.7)
POTASSIUM SERPL-SCNC: 3.8 MMOL/L (ref 3.5–5.3)
PROT SERPL-MCNC: 7.8 G/DL (ref 6.4–8.2)
PROT UR STRIP-MCNC: NEGATIVE MG/DL
RBC # BLD AUTO: 5.5 MILLION/UL (ref 3.88–5.62)
RBC #/AREA URNS AUTO: ABNORMAL /HPF
SODIUM SERPL-SCNC: 135 MMOL/L (ref 136–145)
SP GR UR STRIP.AUTO: 1.02 (ref 1–1.03)
UROBILINOGEN UR QL STRIP.AUTO: 0.2 E.U./DL
WBC # BLD AUTO: 2.25 THOUSAND/UL (ref 4.31–10.16)
WBC #/AREA URNS AUTO: ABNORMAL /HPF

## 2022-05-11 PROCEDURE — 99284 EMERGENCY DEPT VISIT MOD MDM: CPT | Performed by: EMERGENCY MEDICINE

## 2022-05-11 PROCEDURE — 83690 ASSAY OF LIPASE: CPT | Performed by: EMERGENCY MEDICINE

## 2022-05-11 PROCEDURE — 74176 CT ABD & PELVIS W/O CONTRAST: CPT

## 2022-05-11 PROCEDURE — 80053 COMPREHEN METABOLIC PANEL: CPT | Performed by: EMERGENCY MEDICINE

## 2022-05-11 PROCEDURE — 36415 COLL VENOUS BLD VENIPUNCTURE: CPT | Performed by: EMERGENCY MEDICINE

## 2022-05-11 PROCEDURE — 85025 COMPLETE CBC W/AUTO DIFF WBC: CPT | Performed by: EMERGENCY MEDICINE

## 2022-05-11 PROCEDURE — 96361 HYDRATE IV INFUSION ADD-ON: CPT

## 2022-05-11 PROCEDURE — 99284 EMERGENCY DEPT VISIT MOD MDM: CPT

## 2022-05-11 PROCEDURE — 85379 FIBRIN DEGRADATION QUANT: CPT | Performed by: EMERGENCY MEDICINE

## 2022-05-11 PROCEDURE — 96374 THER/PROPH/DIAG INJ IV PUSH: CPT

## 2022-05-11 PROCEDURE — G1004 CDSM NDSC: HCPCS

## 2022-05-11 PROCEDURE — 81001 URINALYSIS AUTO W/SCOPE: CPT

## 2022-05-11 RX ORDER — KETOROLAC TROMETHAMINE 30 MG/ML
15 INJECTION, SOLUTION INTRAMUSCULAR; INTRAVENOUS ONCE
Status: COMPLETED | OUTPATIENT
Start: 2022-05-11 | End: 2022-05-11

## 2022-05-11 RX ORDER — ACETAMINOPHEN 325 MG/1
650 TABLET ORAL ONCE
Status: DISCONTINUED | OUTPATIENT
Start: 2022-05-11 | End: 2022-05-11 | Stop reason: HOSPADM

## 2022-05-11 RX ORDER — LIDOCAINE 50 MG/G
1 PATCH TOPICAL ONCE
Status: DISCONTINUED | OUTPATIENT
Start: 2022-05-11 | End: 2022-05-11 | Stop reason: HOSPADM

## 2022-05-11 RX ADMIN — LIDOCAINE 1 PATCH: 50 PATCH CUTANEOUS at 19:54

## 2022-05-11 RX ADMIN — SODIUM CHLORIDE 1000 ML: 0.9 INJECTION, SOLUTION INTRAVENOUS at 17:32

## 2022-05-11 RX ADMIN — KETOROLAC TROMETHAMINE 15 MG: 30 INJECTION, SOLUTION INTRAMUSCULAR at 17:36

## 2022-05-11 NOTE — ED NOTES
Patient repeatedly stating he is upset we cannot find out what is wrong with him  Dr Horacio Cohen at bedside, explained to patient all testing and results and provided numbers for outpatient referrals to GI and urology       Kati Peck RN  05/11/22 4730 Fl-Sarah, MARY  05/11/22 3857

## 2022-05-11 NOTE — ED PROVIDER NOTES
History  Chief Complaint   Patient presents with    Abdominal Pain     Pt reports all over abd pain specifically in L side since Saturday  Comes and goes, given sucrafate which was not helping  60 yo M presenting for evaluation of L sided abdominal/flank/back pain  Sx started 6 days ago  No inciting event/injury  Pain initially to L side of abdomen, but now is wrapping around to L flank/back  Constant  Occasionally feels like burning  No a/e factors  Occasionally worse after eating, but not consistently  No history of similar prior to this  Denies fevers, CP, SOB, N/V/D/C, urinary complaints, rash  Seen in ED for same on 5/7, 4 days ago- labs unrevealing other than WBC 2 (chronic)  CT A/P neg  Given Mylanta/Pepcif in ED and discharged with Carafate  Called for GI appointment, states next available was in 3 months  60 yo M with L abdominal/flank pain- will repeat abdominal labs and CT (will add oral contrast) to see if any new findings/changes (unable to do IV contrast given shortage), urine  If negative workup, discussed he will need to f/u with PCP, GI and to watch for any zoster rash/worsening sx             Prior to Admission Medications   Prescriptions Last Dose Informant Patient Reported? Taking?   famotidine (PEPCID) 20 mg tablet   No No   Sig: Take 1 tablet (20 mg total) by mouth 2 (two) times a day for 10 days   sucralfate (CARAFATE) 1 g tablet   No No   Sig: Take 1 tablet (1 g total) by mouth 4 (four) times a day for 10 days      Facility-Administered Medications: None       Past Medical History:   Diagnosis Date    Left ear hearing loss     Lymphedema     Pulmonary embolism ([facility] [place] [address] )     Sarcoidosis        History reviewed  No pertinent surgical history  History reviewed  No pertinent family history  I have reviewed and agree with the history as documented      E-Cigarette/Vaping    E-Cigarette Use Never User      E-Cigarette/Vaping Substances    Nicotine No     THC No     CBD No     Flavoring No     Other No     Unknown No      Social History     Tobacco Use    Smoking status: Never Smoker    Smokeless tobacco: Never Used   Vaping Use    Vaping Use: Never used   Substance Use Topics    Alcohol use: Not Currently    Drug use: Never       Review of Systems   Constitutional: Negative for chills, fever and unexpected weight change  HENT: Negative for ear pain, rhinorrhea and sore throat  Eyes: Negative for pain and visual disturbance  Respiratory: Negative for cough and shortness of breath  Cardiovascular: Negative for chest pain and leg swelling  Gastrointestinal: Positive for abdominal pain  Negative for constipation, diarrhea, nausea and vomiting  Endocrine: Negative for polydipsia, polyphagia and polyuria  Genitourinary: Positive for flank pain  Negative for dysuria, frequency, hematuria and urgency  Musculoskeletal: Positive for back pain  Negative for myalgias and neck pain  Skin: Negative for color change and rash  Allergic/Immunologic: Negative for environmental allergies and immunocompromised state  Neurological: Negative for dizziness, weakness, light-headedness, numbness and headaches  Hematological: Negative for adenopathy  Does not bruise/bleed easily  Psychiatric/Behavioral: Negative for agitation and confusion  All other systems reviewed and are negative  Physical Exam  Physical Exam  Vitals and nursing note reviewed  Constitutional:       General: He is not in acute distress  Appearance: He is well-developed  HENT:      Head: Normocephalic and atraumatic  Nose: Nose normal    Eyes:      Conjunctiva/sclera: Conjunctivae normal    Cardiovascular:      Rate and Rhythm: Normal rate and regular rhythm  Heart sounds: Normal heart sounds  Pulmonary:      Effort: Pulmonary effort is normal  No respiratory distress  Breath sounds: Normal breath sounds  No stridor  No wheezing or rales  Chest:      Chest wall: No tenderness  Abdominal:      General: There is no distension  Palpations: Abdomen is soft  Tenderness: There is no abdominal tenderness  There is no guarding or rebound  Comments: No skin changes/rash to abdomen/back, no tenderness to palpation  No palpable hernias   Musculoskeletal:         General: No swelling or deformity  Cervical back: Normal range of motion and neck supple  Skin:     General: Skin is warm and dry  Findings: No rash  Neurological:      General: No focal deficit present  Mental Status: He is alert and oriented to person, place, and time  Motor: No abnormal muscle tone  Coordination: Coordination normal    Psychiatric:         Thought Content:  Thought content normal          Judgment: Judgment normal          Vital Signs  ED Triage Vitals   Temperature Pulse Respirations Blood Pressure SpO2   05/11/22 1340 05/11/22 1338 05/11/22 1338 05/11/22 1338 05/11/22 1338   98 4 °F (36 9 °C) 82 19 137/99 98 %      Temp Source Heart Rate Source Patient Position - Orthostatic VS BP Location FiO2 (%)   05/11/22 1340 05/11/22 1338 05/11/22 1338 05/11/22 1338 --   Oral Monitor Sitting Left arm       Pain Score       05/11/22 1338       10 - Worst Possible Pain           Vitals:    05/11/22 1338 05/11/22 1637 05/11/22 1945   BP: 137/99 145/95 129/82   Pulse: 82 62 70   Patient Position - Orthostatic VS: Sitting Lying          Visual Acuity      ED Medications  Medications   lidocaine (LIDODERM) 5 % patch 1 patch (1 patch Topical Medication Applied 5/11/22 1954)   acetaminophen (TYLENOL) tablet 650 mg (650 mg Oral Not Given 5/11/22 1956)   sodium chloride 0 9 % bolus 1,000 mL (0 mL Intravenous Stopped 5/11/22 1832)   ketorolac (TORADOL) injection 15 mg (15 mg Intravenous Given 5/11/22 1736)   barium (READI-CAT 2) suspension 900 mL (900 mL Oral Given 5/11/22 1859)       Diagnostic Studies  Results Reviewed     Procedure Component Value Units Date/Time    Urine Microscopic [[id number]]  (Abnormal) Collected: 05/11/22 1903    Lab Status: Final result Specimen: Urine, Clean Catch Updated: 05/11/22 1920     RBC, UA 0-1 /hpf      WBC, UA None Seen /hpf      Epithelial Cells Occasional /hpf      Bacteria, UA Occasional /hpf      MUCUS THREADS Occasional    Urine Macroscopic, POC [[id number]]  (Abnormal) Collected: 05/11/22 1903    Lab Status: Final result Specimen: Urine Updated: 05/11/22 1904     Color, UA Yellow     Clarity, UA Clear     pH, UA 6 0     Leukocytes, UA Negative     Nitrite, UA Negative     Protein, UA Negative mg/dl      Glucose, UA Negative mg/dl      Ketones, UA Negative mg/dl      Urobilinogen, UA 0 2 E U /dl      Bilirubin, UA Negative     Blood, UA Trace     Specific Lillian, UA 1 020    Narrative:      CLINITEK RESULT    D-dimer, quantitative [[id number]]  (Normal) Collected: 05/11/22 1803    Lab Status: Final result Specimen: Blood from Arm, Right Updated: 05/11/22 1901     D-Dimer, Quant <0 27 ug/ml FEU     Narrative: In the evaluation for possible pulmonary embolism, in the appropriate (Well's Score of 4 or less) patient, the age adjusted d-dimer cutoff for this patient can be calculated as:    Age x 0 01 (in ug/mL) for Age-adjusted D-dimer exclusion threshold for a patient over 50 years      Comprehensive metabolic panel [[id number]]  (Abnormal) Collected: 05/11/22 1731    Lab Status: Final result Specimen: Blood from Arm, Right Updated: 05/11/22 1751     Sodium 135 mmol/L      Potassium 3 8 mmol/L      Chloride 99 mmol/L      CO2 30 mmol/L      ANION GAP 6 mmol/L      BUN 12 mg/dL      Creatinine 0 99 mg/dL      Glucose 89 mg/dL      Calcium 8 5 mg/dL      AST 19 U/L      ALT 29 U/L      Alkaline Phosphatase 60 U/L      Total Protein 7 8 g/dL      Albumin 3 9 g/dL      Total Bilirubin 0 61 mg/dL      eGFR 84 ml/min/1 73sq m     Narrative:      [facility] guidelines for Chronic Kidney Disease (CKD):     Stage 1 with normal or high GFR (GFR > 90 mL/min/1 73 square meters)    Stage 2 Mild CKD (GFR = 60-89 mL/min/1 73 square meters)    Stage 3A Moderate CKD (GFR = 45-59 mL/min/1 73 square meters)    Stage 3B Moderate CKD (GFR = 30-44 mL/min/1 73 square meters)    Stage 4 Severe CKD (GFR = 15-29 mL/min/1 73 square meters)    Stage 5 End Stage CKD (GFR <15 mL/min/1 73 square meters)  Note: GFR calculation is accurate only with a steady state creatinine    Lipase [[id number]]  (Normal) Collected: 05/11/22 1731    Lab Status: Final result Specimen: Blood from Arm, Right Updated: 05/11/22 1751     Lipase 77 u/L     CBC and differential [[id number]]  (Abnormal) Collected: 05/11/22 1731    Lab Status: Final result Specimen: Blood from Arm, Right Updated: 05/11/22 1736     WBC 2 25 Thousand/uL      RBC 5 50 Million/uL      Hemoglobin 14 8 g/dL      Hematocrit 43 8 %      MCV 80 fL      MCH 26 9 pg      MCHC 33 8 g/dL      RDW 13 6 %      MPV 8 9 fL      Platelets 769 Thousands/uL      nRBC 0 /100 WBCs      Neutrophils Relative 54 %      Immat GRANS % 0 %      Lymphocytes Relative 29 %      Monocytes Relative 15 %      Eosinophils Relative 2 %      Basophils Relative 0 %      Neutrophils Absolute 1 20 Thousands/µL      Immature Grans Absolute 0 00 Thousand/uL      Lymphocytes Absolute 0 66 Thousands/µL      Monocytes Absolute 0 33 Thousand/µL      Eosinophils Absolute 0 05 Thousand/µL      Basophils Absolute 0 01 Thousands/µL                  CT abdomen pelvis wo contrast   ED Interpretation by Nikita Lanier DO (05/11 1936)   FINDINGS:     ABDOMEN     LOWER CHEST:  Subsegmental atelectasis in right lower lobe      LIVER/BILIARY TREE:  Unremarkable      GALLBLADDER:  No calcified gallstones  No pericholecystic inflammatory change      SPLEEN:  Unremarkable      PANCREAS:  Unremarkable      ADRENAL GLANDS:  Unremarkable      KIDNEYS/URETERS:  Unremarkable  No hydronephrosis      STOMACH AND BOWEL:  Normal CT appearance of the stomach    No evidence of small bowel obstruction or acute inflammatory bowel change  No significant colonic diverticulosis  Oral contrast in small bowel:       APPENDIX:  No findings to suggest appendicitis      ABDOMINOPELVIC CAVITY:  No ascites  No pneumoperitoneum  No lymphadenopathy      VESSELS: Limited evaluation without IV contrast   Mild scattered calcified atherosclerotic disease  IVC is normal in caliber      PELVIS     REPRODUCTIVE ORGANS:  Unremarkable for patient's age      URINARY BLADDER:  Underdistended      ABDOMINAL WALL/INGUINAL REGIONS:  Postsurgical changes in l   eft inguinal region, unchanged      OSSEOUS STRUCTURES:  No acute fracture or destructive osseous lesion  Mild multilevel degenerative changes of lower thoracic and lumbar spine      IMPRESSION:     No acute abnormality in abdomen or pelvis      Additional chronic/incidental findings as detailed above          Final Result by Gilma Robin MD (05/11 1931)      No acute abnormality in abdomen or pelvis  Additional chronic/incidental findings as detailed above  Workstation performed: [id number]                    Procedures  Procedures         ED Course  ED Course as of 05/11/22 2137   Wed May 11, 2022   1749 WBC(!): 2 25  2 7- 3 4 in January 2022   1759 Notified by nurse that pt is now reporting L calf pain and is concerned for DVT and requesting a ddimer test- pt is anticoagulated  Added ddimer, will get venous duplex if elevated   1918 D-Dimer, Quant: <0 27   1955 RBC, UA(!): 0-1  Pt is concerned regarding potentially having 1 RBC in his urine, discussed Urology would be next workup for microscopic hematuria if needed                               SBIRT 20yo+    Flowsheet Row Most Recent Value   SBIRT (25 yo +)    In order to provide better care to our patients, we are screening all of our patients for alcohol and drug use  Would it be okay to ask you these screening questions?  No Filed at: 05/11/2022 4534 MDM  Number of Diagnoses or Management Options  Left-sided abdominal pain of unknown etiology  Leukopenia  Diagnosis management comments: 60 yo M presenting with L sided abdominal/back pain of uncertain etiology, ED workup reviewed with pt today  Discussed symptomatic management, PCP, GI f/u  Return precautions reviewed and pt expressed understanding with plan       Amount and/or Complexity of Data Reviewed  Clinical lab tests: ordered and reviewed  Tests in the radiology section of CPT®: ordered and reviewed  Tests in the medicine section of CPT®: ordered and reviewed  Review and summarize past medical records: yes  Independent visualization of images, tracings, or specimens: yes        Disposition  Final diagnoses:   Leukopenia   Left-sided abdominal pain of unknown etiology     Time reflects when diagnosis was documented in both MDM as applicable and the Disposition within this note     Time User Action Codes Description Comment    5/11/2022  6:19 PM Hope Rainey Add [T40 618] Leukopenia     5/11/2022  7:36 PM Jason HYMAN Add [R10 9] Left-sided abdominal pain of unknown etiology     5/11/2022  7:36 PM Hope Rainey Modify [Y88 625] Leukopenia     5/11/2022  7:36 PM Jason HYMAN Modify [R10 9] Left-sided abdominal pain of unknown etiology       ED Disposition     ED Disposition   Discharge    Condition   Stable    Date/Time   Wed May 11, 2022  7:36 PM    Comment   Ed Geoffrey Jacobsen  discharge to home/self care                 Follow-up Information     Follow up With Specialties Details Why Contact Info Additional [address], DO Internal Medicine   [address]  [facility] [address]  [address] [facility]   [address]  [address] [facility] [address]  Neha Mackenzie [address] [facility] [address], [address], [address] [place], [place], [address] [address] [facility]   [facility] [address]  [address] [phone]66  [facility] [address], [place], [place], [address] [phone]          Discharge Medication List as of 5/11/2022  7:50 PM      CONTINUE these medications which have NOT CHANGED    Details   famotidine (PEPCID) 20 mg tablet Take 1 tablet (20 mg total) by mouth 2 (two) times a day for 10 days, Starting Sat 5/7/2022, Until Tue 5/17/2022, Print      sucralfate (CARAFATE) 1 g tablet Take 1 tablet (1 g total) by mouth 4 (four) times a day for 10 days, Starting Sat 5/7/2022, Until Tue 5/17/2022, Print             No discharge procedures on file      PDMP Review     None          ED Provider  Electronically Signed by           Amelia Helm DO  05/11/22 3130

## 2022-05-11 NOTE — DISCHARGE INSTRUCTIONS
Follow up with your family doctor, GI and Urology  Lidoderm patches- can buy over the counter, on for 12 hours/off for 12 hours    Return the ED if you develop any new/worsening symptoms/concerns

## 2022-05-11 NOTE — ED NOTES
Charge nurse notified that pt would like to talk about wait time  Pt reports increase in pain and that people have been being pulled before him even though they checked in after  Pt expressed frustration since Saturday he was here and seen in 20min  RN explained that the flow of the department changes everyday, some days have shorter wait times than others  Pt states " well then you should work on making some more beds"   Pt then states " Im becoming to think this is a race thing, all the Caucasians have been going back and if you look out in the waiting room its all brown people " RN assured pt that was not the case and people may be going back before him due to acuity  RN told the pt there will be at least another 1hr wait till he is brought back   Pt states " well im not going to be nice cause kelsey been sitting out her being treated like meat"     Beatriz Craig, MARY  05/11/22 340 Benny Montoya, RN  05/11/22 9135

## 2022-06-07 ENCOUNTER — OFFICE VISIT (OUTPATIENT)
Dept: DENTISTRY | Facility: CLINIC | Age: 57
End: 2022-06-07

## 2022-06-07 VITALS — SYSTOLIC BLOOD PRESSURE: 133 MMHG | DIASTOLIC BLOOD PRESSURE: 88 MMHG | HEART RATE: 84 BPM | TEMPERATURE: 97.5 F

## 2022-06-07 DIAGNOSIS — K02.9 DENTAL CARIES: Primary | ICD-10-CM

## 2022-06-07 PROCEDURE — WIS2000 CROWN PREP: Performed by: DENTIST

## 2022-06-08 ENCOUNTER — OFFICE VISIT (OUTPATIENT)
Dept: DENTISTRY | Facility: CLINIC | Age: 57
End: 2022-06-08

## 2022-06-08 VITALS — DIASTOLIC BLOOD PRESSURE: 89 MMHG | HEART RATE: 94 BPM | TEMPERATURE: 98.4 F | SYSTOLIC BLOOD PRESSURE: 134 MMHG

## 2022-06-08 DIAGNOSIS — K02.9 DENTAL CARIES: Primary | ICD-10-CM

## 2022-06-08 NOTE — PROGRESS NOTES
Pt presents to [facility] for final impression of #31  ASA 2  Pain score 0    Pt said that his temp crown has been feeling high  He said that it came off a few weeks ago  Explained to pt that new temp crown will be needed to maintain spaces and present cavities on the tooth that was prepped  Pt understood and agreed to crown  Adequate occlusal clearance and reduction  New temp crown was made using Provisa and previous fabricated stent  Occlusion checked with articulating paper  Temp crown was adjusted  Pt confirmed that temp crown felt comfortable  Before and after cementing  Tooth was cemented with Durelon temp crown cement  Pt was informed that processing time for crown can vary  Pt left in stable condition      DONE TODAY  Re-made temp crown #31    NEXT VISIT  Final impression for tooth #31    Attending: Dr Barbara Serrato

## 2022-06-13 NOTE — PROGRESS NOTES
Crown Prep    Ed Soledad Castro  presents for final impressions for crown #31  PMH reviewed, no changes  Applied topical benzocaine, administered 1 carp 4% articaine 1:100k epi via buccal/local infiltration  Confirmed provisional covers margins with no overhangs  Packed size 00 cord soaked in hemodent  Removed cord, air dry  Impression made with PVS light and heavy body using triple tray  Impression removed after 5 min, confirmed preparation captured with no voids or distortions  Cemented provisional crown using temp bond  Removed excess cement, occlusion and contacts verified  Selected porcelain shade A2 pt confirmed with mirror  Pt left satisfied and ambulatory      NV: delivery of PFZ crown #31    Attending: Dr Argentina Carbajal

## 2022-06-14 ENCOUNTER — OFFICE VISIT (OUTPATIENT)
Dept: DENTISTRY | Facility: CLINIC | Age: 57
End: 2022-06-14

## 2022-06-14 VITALS — TEMPERATURE: 97.8 F | HEART RATE: 85 BPM | DIASTOLIC BLOOD PRESSURE: 87 MMHG | SYSTOLIC BLOOD PRESSURE: 143 MMHG

## 2022-06-14 DIAGNOSIS — Z01.21 ENCOUNTER FOR DENTAL EXAMINATION AND CLEANING WITH ABNORMAL FINDINGS: Primary | ICD-10-CM

## 2022-06-14 PROCEDURE — D0140 LIMITED ORAL EVALUATION - PROBLEM FOCUSED: HCPCS | Performed by: DENTIST

## 2022-06-14 NOTE — PROGRESS NOTES
Pt presents to [facility] for limited exam with cc:"My temp crown came off "    ASA 2  Pain score 0    Last visit was last week for final impressions for crown on tooth #31  For today's visit, pt reported that he left his temporary crown at home  New crown was made from the isoform molar  Pre made crown was trimmed using scissors  Savana Boggs was used for temp crown material  Temp crown was cemented using Durelon  Excess cement was removed using explorer and knotted floss  No anesthesia was used today  Pt confirmed that he was comfortable during entirety of visit  Occlusion was checked and verified  Pt left in stable condition      DONE TODAY  Cemented new temp crown    NEXT VISIT  Deliver crown #31     Attending: Dr Florentino Velazquez

## 2022-07-27 ENCOUNTER — TELEPHONE (OUTPATIENT)
Dept: DENTISTRY | Facility: CLINIC | Age: 57
End: 2022-07-27

## 2022-08-04 ENCOUNTER — OFFICE VISIT (OUTPATIENT)
Dept: DENTISTRY | Facility: CLINIC | Age: 57
End: 2022-08-04

## 2022-08-04 VITALS — TEMPERATURE: 99.1 F | SYSTOLIC BLOOD PRESSURE: 132 MMHG | HEART RATE: 103 BPM | DIASTOLIC BLOOD PRESSURE: 84 MMHG

## 2022-08-04 DIAGNOSIS — Z01.20 ENCOUNTER FOR DENTAL EXAMINATION: Primary | ICD-10-CM

## 2022-08-04 NOTE — PATIENT INSTRUCTIONS
NV: crown prep refinement and final impressions (75 min) in either dr Maren Gamble or dr Carla Hendrickson schedule plz schedule ASAP as per dr Carla Hendrickson ideally before the end of the week next week

## 2022-08-04 NOTE — PROGRESS NOTES
Wellington Patel Delivery #30    Pt presents for delivery of PFZ crown  #31  PMH reviewed, no changes  ASA I, no pain  Pt reports that he had 5 provisional crowns break and or fall off  Pt did not have a provisional crown during the visit, pt said he experienced some sensitivity when the crown first came off but the sensitivity stopped over time    Pt opted for no anesthesia during procedure    [place] tried intraorally, the mesial contact was open ~1-1 5 mm and the lingual margin was open  After consulting with Dr Antonio Barker it was determined that crown could not be adjusted and needs to be re-made  After assessing the prep the following was determined:    1  The crown prep needs to be refined to create better defined margins   2   There is insufficient occlusal reduction especially on the distal marginal ridge       Pt to return and have the crown prep refined, final impressions and a new provisional     PT understands tx plan and was dismissed ambulatory     NV 1: crown prep refinement, final impressions  NV 2: crown delivery

## 2022-08-18 ENCOUNTER — OFFICE VISIT (OUTPATIENT)
Dept: DENTISTRY | Facility: CLINIC | Age: 57
End: 2022-08-18

## 2022-08-18 VITALS — HEART RATE: 86 BPM | DIASTOLIC BLOOD PRESSURE: 90 MMHG | SYSTOLIC BLOOD PRESSURE: 131 MMHG | TEMPERATURE: 97.8 F

## 2022-08-18 DIAGNOSIS — Z01.20 ENCOUNTER FOR DENTAL EXAMINATION: Primary | ICD-10-CM

## 2022-08-18 NOTE — PROGRESS NOTES
Crown Prep    Ed Jerral Schwab  presents for crown prep #31  PMH reviewed, no changes  ASA I no pain    Prep is to be refined and final impressions taken  As per last visit, occlusal reduction is inadequate and needs to be refined along with margins    Applied topical benzocaine, administered 1 carps 4% articaine 1:100k epi via local infiltration  A slight occlusal reduction was made ~0 5mm  Upon assessing the prep it was determined that there may not be sufficient tooth structure left for occlusal reduction without exposing the pulp  A bitewing was taken to assess how much remaining tooth structure was present  Upon assessing the radiograph it was determined in consultation with Dr Alessandro Lei that there should be no further reduction for risk of pulp exposure  The following tx plans were presented:  1  Extract the tooth and add it to RPD  2  RCT on tooth #31 followed by continuation of crown   3  Enameloplasty on tooth #2 to achieve required occlusal clearance     Risks and benefits of each tx were discussed  PT elected to undergo RCT on tooth 31  Explained that RCT usually requires at least 3 appointments and they are usually spaced far apart due to scheduling  Pt understands and wishes to proceed  All questions answered  No temp placed on the tooth as pt is asymptomatic and has been without a temp for several months  & there is not enough occlusal clearance for a temp  Pt dismissed ambulatory?     NV: RCT #31

## 2022-10-04 ENCOUNTER — OFFICE VISIT (OUTPATIENT)
Dept: DENTISTRY | Facility: CLINIC | Age: 57
End: 2022-10-04

## 2022-10-04 VITALS — DIASTOLIC BLOOD PRESSURE: 75 MMHG | HEART RATE: 90 BPM | SYSTOLIC BLOOD PRESSURE: 123 MMHG | TEMPERATURE: 99.5 F

## 2022-10-04 DIAGNOSIS — Z01.20 ENCOUNTER FOR DENTAL EXAMINATION: Primary | ICD-10-CM

## 2022-10-04 PROCEDURE — WIS2001 FINAL IMPRESSION - CROWN OR IMPLANT: Performed by: DENTIST

## 2022-10-10 NOTE — PROGRESS NOTES
PT present for RCT tx for tooth #31    ASA I, no pain     Upon assessment with Dr Odalys Teresa, RCT was not indicated at this time and suggested attempting a gold crown instead  This was suggested because a gold crown would fit in the limited interocclusal space available without need for RCT    Pt was hesitant about gold crown at first, but pt does not want a root canal and decided to try the gold crown as an alternative    Tooth #31 was vitality tested via cold test and EPT  Tested WNL      Fabricated matrix with pre-made SSC    Applied topical benzocaine, administered 1 carps 2% lido 1:100k epi via MEGHNA block     Dr Odalys Teresa prepped the tooth for a gold grown with lio burs and use of coolant (water)   Made provisional crown with provisa and matrix, refined with lio on high speed  Confirmed provisional covers margins with no overhangs  Retracted gingiva with xposil  Impression made with Delkit light and heavy body using triple tray  Impression removed after 5 min, confirmed preparation captured with no voids or distortions  Cemented provisional crown using Provicell  Removed excess cement, occlusion and contacts verified  Informed pt that we would monitor the tooth for symptoms for a week and call  If the tooth is asymptomatic 1 week from now the impression will be sent to the lab for crown fabrication  Pt left satisfied and ambulatory    ?    NV: delivery of gold crown if asymptomatic

## 2022-10-21 ENCOUNTER — OFFICE VISIT (OUTPATIENT)
Dept: DENTISTRY | Facility: CLINIC | Age: 57
End: 2022-10-21

## 2022-10-21 VITALS — HEART RATE: 88 BPM | SYSTOLIC BLOOD PRESSURE: 127 MMHG | DIASTOLIC BLOOD PRESSURE: 87 MMHG | TEMPERATURE: 98.6 F

## 2022-10-21 DIAGNOSIS — Z01.20 ENCOUNTER FOR DENTAL EXAMINATION: Primary | ICD-10-CM

## 2022-10-27 NOTE — PROGRESS NOTES
Pt  Called office  Day previous to his appointment c/o swelling around his tooth and around his "gland" pt reports that temporary crown fractured  Pt called to asked to antibiotics  Pt denies fever, visual changes or other systemic symptoms  Informed pt that I would not be able to provide abx without clinical assessment  Advised pt to go to the ED for concerning and or systemic involvement  Gave pt expedited appointment for the next day to assess     PT presented today c/o of broken temporary crown on #31 and swelling on his right side of the face  Pt was eating tortilla chips when temporary fractured and shortly after developed reported symptoms     E/O wnl, hard denisha-aucular nodule noted  Pt is afebrile can eat and swallow normally, pt reported going to Emergency clinic and obtaining antibiotics (amox) that he is currently taking     I/O distal aspect of gingiva inflammed where the temp broke  Clinical finding suggestive of food trapping beneath the gingiva leading to discomfort and swelling    Cold test performed and tooth responded normal against contralateral control    PA was taken NSF    Tooth is percussion negative    PD normal    Removed old temp and cleaned the tooth with a zulema gently without altering the prep  A new provisional was made with provisa and matrix, refined with lio on high speed  Confirmed provisional covers margins with no overhangs  Cemented provisional crown using Provicell  Removed excess cement, occlusion and contacts verified  Reduction of enamel on the opposing tooth was performed  Pt left satisfied and ambulatory  Emphasized to pt the importance of not chewing hard or sticky foods on the tooth   ?    NV: delivery of gold crown

## 2022-11-02 ENCOUNTER — OFFICE VISIT (OUTPATIENT)
Dept: DENTISTRY | Facility: CLINIC | Age: 57
End: 2022-11-02

## 2022-11-02 VITALS — DIASTOLIC BLOOD PRESSURE: 80 MMHG | SYSTOLIC BLOOD PRESSURE: 122 MMHG | HEART RATE: 76 BPM | TEMPERATURE: 99.2 F

## 2022-11-02 DIAGNOSIS — Z01.21 ENCOUNTER FOR DENTAL EXAMINATION AND CLEANING WITH ABNORMAL FINDINGS: Primary | ICD-10-CM

## 2022-11-02 PROBLEM — D70.0 CONGENITAL LEUKOPENIA (HCC): Status: ACTIVE | Noted: 2022-11-02

## 2022-11-02 PROBLEM — G44.229 CHRONIC TENSION-TYPE HEADACHE, NOT INTRACTABLE: Status: ACTIVE | Noted: 2022-08-23

## 2022-11-02 PROBLEM — G47.33 OSA (OBSTRUCTIVE SLEEP APNEA): Status: ACTIVE | Noted: 2018-07-05

## 2022-11-02 PROBLEM — G47.21 CIRCADIAN RHYTHM SLEEP DISORDER, DELAYED SLEEP PHASE TYPE: Status: ACTIVE | Noted: 2021-10-28

## 2022-11-02 PROBLEM — I10 HTN (HYPERTENSION): Status: ACTIVE | Noted: 2017-05-01

## 2022-11-02 PROBLEM — Z87.820 HISTORY OF CONCUSSION: Status: ACTIVE | Noted: 2021-04-15

## 2022-11-02 RX ORDER — ZOLPIDEM TARTRATE 5 MG/1
5 TABLET ORAL DAILY PRN
COMMUNITY
Start: 2022-07-15

## 2022-11-02 RX ORDER — AMLODIPINE BESYLATE 5 MG/1
5 TABLET ORAL DAILY
COMMUNITY
Start: 2022-10-13

## 2022-11-02 RX ORDER — AMOXICILLIN 500 MG/1
CAPSULE ORAL
COMMUNITY
Start: 2022-10-20

## 2022-11-02 RX ORDER — OMEPRAZOLE 40 MG/1
40 CAPSULE, DELAYED RELEASE ORAL DAILY
COMMUNITY
Start: 2022-10-13

## 2022-11-02 RX ORDER — APIXABAN 5 MG/1
TABLET, FILM COATED ORAL
COMMUNITY
Start: 2022-10-31

## 2022-11-02 RX ORDER — LORATADINE 10 MG/1
10 TABLET ORAL DAILY
COMMUNITY
Start: 2022-03-31 | End: 2023-03-31

## 2022-11-02 RX ORDER — BUTALBITAL, ACETAMINOPHEN AND CAFFEINE 50; 325; 40 MG/1; MG/1; MG/1
TABLET ORAL
COMMUNITY
Start: 2022-08-10

## 2022-11-02 RX ORDER — HYDROCHLOROTHIAZIDE 25 MG/1
TABLET ORAL
COMMUNITY
Start: 2022-10-13

## 2022-11-02 NOTE — PROGRESS NOTES
Dental procedures in this visit   •  - PERIODIC ORAL EVALUATION - ESTABLISHED PATIENT (Completed)     Service provider: Miriam Sofia DDS     Billing provider: Jordy Shah DDS   •  - PROPHYLAXIS - ADULT (Completed)     Service provider: Jolie Mata     Billing provider: Jordy Shah DDS   •  - [address] (Completed)     Service provider: Jolie Mata     Billing provider: Jordy Shah DDS   • [address] (Completed)     Service provider: Jolie Mata     Billing provider: Jordy Shah DDS   •  - VERTICAL BITEWINGS - 7 TO 8 RADIOGRAPHIC IMAGES (Completed)     Service provider: Jolie Mata     Billing provider: Jordy Shah DDS     Subjective   Patient ID: Rachana Aranda  is a 61 y o  male  Chief Complaint   Patient presents with   • Periodic Exam   • Routine Oral Cleaning     Adult Prophy    ASA II  Pain:  0  Reviewed M/DH;  HBP     Exams:  Periodic exam     ---Pt was seen in the  ER 10/20/22 - having a lot of pain and swelling in the enlarged lymph node below his right ear  He was seen here here on 10/21/22 because he cracked his temporary crown on #31 - a new provisional was made and a PA taken at that time  Dr Romayne Martins did not feel there was a correlation between the cracked temp and his swollen lymph node  She suggested he follow up with an ENT to rule out an inner ear problem  He stated he has gone through 8 temps since starting the crown  Xrays:   7  Vertical BWs and 2 PA's 2, 31   Type of Treatment:  Adult Prophy - Hand scaling,  Polished, Flossed  Reviewed OHI  Brush:  2X/day and Floss 1X/day  Recommended Listerine  / ACT  mouth rinses  EO/OCS Exams:  No significant findings  IO: No significant findings  Occlusion:  Pt is a severe  - many teeth have a lot of attrition  He normally wears a nightguard but he needs to get another one    He buys the over the counter kind  Oral Hygiene:  Good / Fair  Plaque:  Light    Calculus:  Light   Bleeding:  Light  - localized  Gingiva:  Pink  / Firm  /  Red   /  Inflamed  Stain:  Light  - mostly lower anteriors - coffee  Perio Charting:  Periocharting was not completed  Probe at next recall  Perio Findings:  Stage 1, Grade A  Caries Findings:  Watch interproximals  Gold crown #31 to be delivered when back from lab  Caries Risk Assessment:    Moderate caries risk    Treatment Plan:  Updated  Pt requested to be put on 3mrc appts  Dr  Exam:  Dr Kristy Mckay  Referral:  No referral given   NV1:  #31 crown del   - 60 min  NV2:  3mrc - per pt - 50 min - prophy only

## 2022-11-28 ENCOUNTER — OFFICE VISIT (OUTPATIENT)
Dept: DENTISTRY | Facility: CLINIC | Age: 57
End: 2022-11-28

## 2022-11-28 VITALS — DIASTOLIC BLOOD PRESSURE: 87 MMHG | TEMPERATURE: 98.7 F | SYSTOLIC BLOOD PRESSURE: 132 MMHG | HEART RATE: 80 BPM

## 2022-11-28 DIAGNOSIS — Z01.20 ENCOUNTER FOR DENTAL EXAMINATION: Primary | ICD-10-CM

## 2022-11-28 NOTE — PROGRESS NOTES
Laura Glimpse Delivery #31  Pt presents for delivery of gold crown  #31  PMH reviewed, no changes  Pt reports no pain or sensitivity from tooth since last visit  Pt opted for no anesthesia during procedure    Provisional had broken, cement removed from prep with  and pumice with prophy brush on slow speed  Odanah tried on die, confirmed accurate seating, margin will adapted and sealed  Odanah tried intraorally  Odanah seating fully with open contact on the mesial this is intentional as to avoid a cantilever effect  Pt was informed that the mesial contact will be open  Pt demonstrates understanding  Verified seating with radiograph  Occlusion verified, crown and opposing tooth (#2) was adjusted via enameloplasty, as per discussion with the lab and clinical evaluation  Pt satisfied with occlusion and satisfy  Pt confirmed satisfaction with appearance of the corwn  Rinse of prep and air dry, isolated with cotton rolls  Cemented with GI cement (ketac oliver) , excess cement removed with probe and high speed suction  After 3 min, floss through contacts to remove interproximal cement  Occlusion and contacts verified  Pt comfortable with bite, left satisfied and ambulatory      NV:  fabrication valplast lower RPD (pt states previous allergy to cast RPD)

## 2023-03-03 ENCOUNTER — OFFICE VISIT (OUTPATIENT)
Dept: DENTISTRY | Facility: CLINIC | Age: 58
End: 2023-03-03

## 2023-03-03 VITALS — DIASTOLIC BLOOD PRESSURE: 96 MMHG | SYSTOLIC BLOOD PRESSURE: 141 MMHG | TEMPERATURE: 97.5 F | HEART RATE: 79 BPM

## 2023-03-03 DIAGNOSIS — Z01.20 ENCOUNTER FOR DENTAL EXAMINATION: Primary | ICD-10-CM

## 2023-03-08 NOTE — PROGRESS NOTES
Pt presents to take impressions for valplast L RPD  [facility], no changes, pt is ASA II, pt reports satisfaction with crown on #31   Discussed with pt getting a valplast denture on maxillary arch as well, pt reports having stopped wearing old denture and the denture not fitting anymore  Pt expressed that as per a conversation with a previous attending, pt as supposed to get a denture made for no charge, however, pt states he is okay to make a payment if need be  Explained that I will ask about the billing before submitting his case so that we know whether dentures for both arches need to be sent to the lab  PT understands     Alginate impressions taken for both arches and poured  Shade matched and picked A3, pt saw different shades with hand mirror in natural light   PT approves of A3     NV: delivery of valplast denture (just lower or both arches based on pt's choice)

## 2023-03-28 ENCOUNTER — OFFICE VISIT (OUTPATIENT)
Dept: DENTISTRY | Facility: CLINIC | Age: 58
End: 2023-03-28

## 2023-03-28 VITALS — HEART RATE: 86 BPM | SYSTOLIC BLOOD PRESSURE: 122 MMHG | DIASTOLIC BLOOD PRESSURE: 84 MMHG | TEMPERATURE: 97.4 F

## 2023-03-28 DIAGNOSIS — Z01.20 ENCOUNTER FOR DENTAL EXAMINATION: Primary | ICD-10-CM

## 2023-03-28 NOTE — DENTAL PROCEDURE DETAILS
ASA  II  Pain - 0  Reviewed M/DH    Prophylaxis completed with ultrasonic  and hand instrumentation  ---Lt plaque and calc generalized  ---Soft plaque removed as well as sub and supragingival calculus removed from all teeth     ---Polished with prophy cup and paste     ---Flossed and provided Oral Health Instructions  ---Patient left satisfied and ambulatory  Exam:  none  Xrays:  none  Caries:  Restoration on 29 - B (V) fell out  Will bring back to restore  Pt has no discomfort    Referral:  none    NV1:  Rest 29 - B (V) - 60 min  NV2:  EX, Pro - 3 months from 3/29/23 sundar/ Belinda Cheema

## 2023-06-29 ENCOUNTER — OFFICE VISIT (OUTPATIENT)
Dept: DENTISTRY | Facility: CLINIC | Age: 58
End: 2023-06-29

## 2023-06-29 VITALS — HEART RATE: 90 BPM | DIASTOLIC BLOOD PRESSURE: 80 MMHG | TEMPERATURE: 97.7 F | SYSTOLIC BLOOD PRESSURE: 122 MMHG

## 2023-06-29 DIAGNOSIS — Z01.20 ENCOUNTER FOR DENTAL EXAMINATION: Primary | ICD-10-CM

## 2023-06-29 PROBLEM — I87.2 VENOUS INSUFFICIENCY: Status: ACTIVE | Noted: 2018-03-23

## 2023-06-29 PROBLEM — E11.9 CONTROLLED TYPE 2 DIABETES MELLITUS WITHOUT COMPLICATION, WITHOUT LONG-TERM CURRENT USE OF INSULIN (HCC): Status: ACTIVE | Noted: 2023-03-29

## 2023-06-29 PROBLEM — R35.1 NOCTURIA: Status: ACTIVE | Noted: 2022-11-17

## 2023-06-29 PROBLEM — R10.2 PELVIC PAIN: Status: ACTIVE | Noted: 2022-11-17

## 2023-06-29 PROBLEM — I83.90 VARICOSE VEIN OF LEG: Status: ACTIVE | Noted: 2018-02-16

## 2023-06-29 PROBLEM — I49.3 PVC (PREMATURE VENTRICULAR CONTRACTION): Status: ACTIVE | Noted: 2020-05-29

## 2023-06-29 RX ORDER — METHYLPREDNISOLONE 4 MG/1
TABLET ORAL
COMMUNITY
Start: 2023-03-28

## 2023-06-29 RX ORDER — METFORMIN HYDROCHLORIDE 500 MG/1
TABLET, EXTENDED RELEASE ORAL
COMMUNITY
Start: 2023-06-27

## 2023-06-29 RX ORDER — ALBUTEROL SULFATE 90 UG/1
AEROSOL, METERED RESPIRATORY (INHALATION)
COMMUNITY
Start: 2023-03-28

## 2023-06-29 RX ORDER — AZITHROMYCIN 250 MG/1
TABLET, FILM COATED ORAL
COMMUNITY
Start: 2023-03-24

## 2023-06-29 NOTE — DENTAL PROCEDURE DETAILS
Ed Jannie Loser  presents for a Periodic exam  Verbal consent for treatment given in addition to the forms  Reviewed health history - Patient is ASA II  Consents signed: Yes     Perio: Generalized and Recession  ---Sever erosion and attrition  Pain Scale: 0  Caries Assessment: Medium  Radiographs: None  EO/IO/OCS:  WNL     Oral Hygiene instruction reviewed and given  OHI:  Good  ---Lt calc and plaque  ---Handscaled, polish, floss    Treatment Plan:  1   3mrc - prophy only  2  Caries control: 1 - DO, 6 - IL, 13 - B(V), 22 - IL, 27 - IL, 29 - B(V)  ---Valplast LPD - being looked at by Dr Renu Li - will contact patient when ready  3  Occlusal evaluation:   missing teeth  4  Case Difficulty Type 2    Prognosis is Good    Referrals needed: No  Exam:  Dr Esau Phillips:  Rest 29 - B(V) - 60 min  NV2:  3mrc -prophy only - 50 min

## 2023-07-13 ENCOUNTER — OFFICE VISIT (OUTPATIENT)
Dept: DENTISTRY | Facility: CLINIC | Age: 58
End: 2023-07-13

## 2023-07-13 VITALS — HEART RATE: 74 BPM | TEMPERATURE: 97.8 F | DIASTOLIC BLOOD PRESSURE: 83 MMHG | SYSTOLIC BLOOD PRESSURE: 137 MMHG

## 2023-07-13 DIAGNOSIS — Z01.21 ENCOUNTER FOR DENTAL EXAMINATION AND CLEANING WITH ABNORMAL FINDINGS: Primary | ICD-10-CM

## 2023-07-13 PROCEDURE — D2391 RESIN-BASED COMPOSITE - 1 SURFACE, POSTERIOR: HCPCS | Performed by: DENTIST

## 2023-07-13 NOTE — PROGRESS NOTES
Composite Filling    Ed TREVOR Huynh. presents for composite filling. PMH reviewed, no changes. Discussed with patient need for RCT if pulp exposure occurs or in future if pulp is inflamed. Pt understands and consents. Large and deep cavity as a remaining  Prepped tooth #29 Class (V), and closing a narrow gap between an amalgam filling and the facial cusp at this tooth occlusal surfaces. no anesthesia needed,  with 245 carbide on high speed. Caries removed with round carbide on slow speed. Isolation with cotton rolls and dri-angles    Etch with 37% H2PO4, rinse, dry. Applied Adhese with 20 second scrub once, gentle air dry and light cured for 10s. Restored with Tetric bulk paolo shade A2 and light cured. Refined with finishing burs, polished with enhance point. Verified occlusion and contacts. A large proximal space between # 29 & # 30 is present and harboring a lot of food debris , pt. Informed and instructed to keep the area clean to prevent further damage there. Pt. Jorge Bustos and left happy and satisfied. NV : Continue fillings.

## 2023-07-13 NOTE — DENTAL PROCEDURE DETAILS
Patient presents for a dental restoration and verbally consents for treatment:  Reviewed health history-  Pt is ASA type I  Treatment consents signed: Yes  Perio: Periodontitis  Pain Scale: 0  Caries Assessment: High    Radiographs: Films are current  Oral Hygiene instruction reviewed and given  Hygiene recall visits recommended to the patient    Patient agrees with the diagnosis of Caries and the proposed treatment plan for the resin restoration:  Tooth ##29  Dental Anesthesia:  No  Material:   Etch Ivoclar bond and resin   Shade: Shade A2    Prognosis is Good.    Referrals Needed: No  Next visit: fillings

## 2023-08-04 ENCOUNTER — OFFICE VISIT (OUTPATIENT)
Dept: DENTISTRY | Facility: CLINIC | Age: 58
End: 2023-08-04

## 2023-08-04 VITALS — HEART RATE: 90 BPM | DIASTOLIC BLOOD PRESSURE: 99 MMHG | SYSTOLIC BLOOD PRESSURE: 142 MMHG

## 2023-08-04 DIAGNOSIS — K08.109 MISSING TEETH, ACQUIRED: Primary | ICD-10-CM

## 2023-08-04 PROCEDURE — D0191 ASSESSMENT OF A PATIENT: HCPCS
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
## 2023-08-10 ENCOUNTER — OFFICE VISIT (OUTPATIENT)
Dept: DENTISTRY | Facility: CLINIC | Age: 58
End: 2023-08-10

## 2023-08-10 VITALS — SYSTOLIC BLOOD PRESSURE: 167 MMHG | DIASTOLIC BLOOD PRESSURE: 123 MMHG | HEART RATE: 93 BPM

## 2023-08-10 DIAGNOSIS — K08.109 TEETH MISSING: Primary | ICD-10-CM

## 2023-08-10 PROCEDURE — WIS5001 FINAL IMPRESSIONS DENTURE

## 2023-08-10 NOTE — PROGRESS NOTES
Ed [place]. presents for maxillary and mandibular valplast final impressions. Med Hx: Updated and reviewed. ASA 2. Initial BP reading was elevated at 167/123mmHg, but improved at end of appt to 123/80mmHg. Pt states he takes his HTN medications daily. Pain: 0/10  EO: WNL IE: WNL  Tx Details: Pt had concerns with timeframe and payment but was resolved with  and manager. Tried in upper and lower trays and size was large. Applied VPS adhesive to trays. Final impressions taken with medium body VPS along with border molding and cheek+tongue manipulations. Bite registration was recorded with blubite. Shade recorded as A2 with [employer] shade guide and approved by pt. Pt aware that he has excessive grinding which resulted in generalized severe attrition and loss of VDO with supererupted maxillary posterior teeth which make the procedure difficult. Pt understands and is willing to proceed. Talked to Tita Godoy at [facility] lab and discussed case. He reassured case will be returned as upper and lower teeth try-in in wax as a rush delivery. Pt made aware and was comfortable with future appt and effort made to complete his case quickly.      NV: Maxillary and mandibular teeth try in (future valplast)

## 2023-08-12 ENCOUNTER — HOSPITAL ENCOUNTER (EMERGENCY)
Facility: HOSPITAL | Age: 58
Discharge: HOME | End: 2023-08-12
Attending: EMERGENCY MEDICINE
Payer: MEDICARE

## 2023-08-12 ENCOUNTER — APPOINTMENT (EMERGENCY)
Dept: RADIOLOGY | Facility: HOSPITAL | Age: 58
End: 2023-08-12
Payer: MEDICARE

## 2023-08-12 VITALS
TEMPERATURE: 98.5 F | HEART RATE: 65 BPM | RESPIRATION RATE: 18 BRPM | OXYGEN SATURATION: 99 % | SYSTOLIC BLOOD PRESSURE: 146 MMHG | DIASTOLIC BLOOD PRESSURE: 94 MMHG

## 2023-08-12 DIAGNOSIS — R51.9 HEADACHE: Primary | ICD-10-CM

## 2023-08-12 DIAGNOSIS — D72.819 LEUKOPENIA: ICD-10-CM

## 2023-08-12 DIAGNOSIS — R07.89 NON-CARDIAC CHEST PAIN: ICD-10-CM

## 2023-08-12 LAB
ALBUMIN SERPL BCP-MCNC: 4.3 G/DL (ref 3.5–5)
ALP SERPL-CCNC: 52 U/L (ref 34–104)
ALT SERPL W P-5'-P-CCNC: 17 U/L (ref 7–52)
ANION GAP SERPL CALCULATED.3IONS-SCNC: 7 MMOL/L
AST SERPL W P-5'-P-CCNC: 18 U/L (ref 13–39)
ATRIAL RATE: 68 BPM
BASOPHILS # BLD AUTO: 0 THOUSANDS/ÂΜL (ref 0–0.1)
BASOPHILS NFR BLD AUTO: 0 % (ref 0–1)
BILIRUB SERPL-MCNC: 0.4 MG/DL (ref 0.2–1)
BUN SERPL-MCNC: 18 MG/DL (ref 5–25)
CALCIUM SERPL-MCNC: 9.5 MG/DL (ref 8.4–10.2)
CARDIAC TROPONIN I PNL SERPL HS: 3 NG/L
CHLORIDE SERPL-SCNC: 103 MMOL/L (ref 96–108)
CO2 SERPL-SCNC: 25 MMOL/L (ref 21–32)
CREAT SERPL-MCNC: 1.01 MG/DL (ref 0.6–1.3)
EOSINOPHIL # BLD AUTO: 0.01 THOUSAND/ÂΜL (ref 0–0.61)
EOSINOPHIL NFR BLD AUTO: 0 % (ref 0–6)
ERYTHROCYTE [DISTWIDTH] IN BLOOD BY AUTOMATED COUNT: 13.9 % (ref 11.6–15.1)
GFR SERPL CREATININE-BSD FRML MDRD: 81 ML/MIN/1.73SQ M
GLUCOSE SERPL-MCNC: 113 MG/DL (ref 65–140)
HCT VFR BLD AUTO: 43.3 % (ref 36.5–49.3)
HGB BLD-MCNC: 14.4 G/DL (ref 12–17)
IMM GRANULOCYTES # BLD AUTO: 0 THOUSAND/UL (ref 0–0.2)
IMM GRANULOCYTES NFR BLD AUTO: 0 % (ref 0–2)
LYMPHOCYTES # BLD AUTO: 0.61 THOUSANDS/ÂΜL (ref 0.6–4.47)
LYMPHOCYTES NFR BLD AUTO: 20 % (ref 14–44)
MCH RBC QN AUTO: 26.4 PG (ref 26.8–34.3)
MCHC RBC AUTO-ENTMCNC: 33.3 G/DL (ref 31.4–37.4)
MCV RBC AUTO: 79 FL (ref 82–98)
MONOCYTES # BLD AUTO: 0.35 THOUSAND/ÂΜL (ref 0.17–1.22)
MONOCYTES NFR BLD AUTO: 11 % (ref 4–12)
NEUTROPHILS # BLD AUTO: 2.13 THOUSANDS/ÂΜL (ref 1.85–7.62)
NEUTS SEG NFR BLD AUTO: 69 % (ref 43–75)
NRBC BLD AUTO-RTO: 0 /100 WBCS
P AXIS: 53 DEGREES
PLATELET # BLD AUTO: 209 THOUSANDS/UL (ref 149–390)
PMV BLD AUTO: 9.6 FL (ref 8.9–12.7)
POTASSIUM SERPL-SCNC: 3.9 MMOL/L (ref 3.5–5.3)
PR INTERVAL: 170 MS
PROT SERPL-MCNC: 7 G/DL (ref 6.4–8.4)
QRS AXIS: 51 DEGREES
QRSD INTERVAL: 104 MS
QT INTERVAL: 378 MS
QTC INTERVAL: 401 MS
RBC # BLD AUTO: 5.46 MILLION/UL (ref 3.88–5.62)
SODIUM SERPL-SCNC: 135 MMOL/L (ref 135–147)
T WAVE AXIS: 38 DEGREES
VENTRICULAR RATE: 68 BPM
WBC # BLD AUTO: 3.1 THOUSAND/UL (ref 4.31–10.16)

## 2023-08-12 PROCEDURE — 85025 COMPLETE CBC W/AUTO DIFF WBC: CPT | Performed by: PHYSICIAN ASSISTANT

## 2023-08-12 PROCEDURE — 93005 ELECTROCARDIOGRAM TRACING: CPT

## 2023-08-12 PROCEDURE — 84484 ASSAY OF TROPONIN QUANT: CPT | Performed by: PHYSICIAN ASSISTANT

## 2023-08-12 PROCEDURE — 96375 TX/PRO/DX INJ NEW DRUG ADDON: CPT

## 2023-08-12 PROCEDURE — 96374 THER/PROPH/DIAG INJ IV PUSH: CPT

## 2023-08-12 PROCEDURE — 99285 EMERGENCY DEPT VISIT HI MDM: CPT | Performed by: PHYSICIAN ASSISTANT

## 2023-08-12 PROCEDURE — 96361 HYDRATE IV INFUSION ADD-ON: CPT

## 2023-08-12 PROCEDURE — 71045 X-RAY EXAM CHEST 1 VIEW: CPT

## 2023-08-12 PROCEDURE — 93010 ELECTROCARDIOGRAM REPORT: CPT | Performed by: STUDENT IN AN ORGANIZED HEALTH CARE EDUCATION/TRAINING PROGRAM

## 2023-08-12 PROCEDURE — 36415 COLL VENOUS BLD VENIPUNCTURE: CPT | Performed by: PHYSICIAN ASSISTANT

## 2023-08-12 PROCEDURE — 80053 COMPREHEN METABOLIC PANEL: CPT | Performed by: PHYSICIAN ASSISTANT

## 2023-08-12 PROCEDURE — 99285 EMERGENCY DEPT VISIT HI MDM: CPT

## 2023-08-12 RX ORDER — KETOROLAC TROMETHAMINE 30 MG/ML
15 INJECTION, SOLUTION INTRAMUSCULAR; INTRAVENOUS ONCE
Status: COMPLETED | OUTPATIENT
Start: 2023-08-12 | End: 2023-08-12

## 2023-08-12 RX ORDER — METOCLOPRAMIDE HYDROCHLORIDE 5 MG/ML
10 INJECTION INTRAMUSCULAR; INTRAVENOUS ONCE
Status: COMPLETED | OUTPATIENT
Start: 2023-08-12 | End: 2023-08-12

## 2023-08-12 RX ORDER — DIPHENHYDRAMINE HYDROCHLORIDE 50 MG/ML
25 INJECTION INTRAMUSCULAR; INTRAVENOUS ONCE
Status: COMPLETED | OUTPATIENT
Start: 2023-08-12 | End: 2023-08-12

## 2023-08-12 RX ADMIN — DIPHENHYDRAMINE HYDROCHLORIDE 25 MG: 50 INJECTION, SOLUTION INTRAMUSCULAR; INTRAVENOUS at 02:49

## 2023-08-12 RX ADMIN — SODIUM CHLORIDE 1000 ML: 0.9 INJECTION, SOLUTION INTRAVENOUS at 02:45

## 2023-08-12 RX ADMIN — METOCLOPRAMIDE 10 MG: 5 INJECTION, SOLUTION INTRAMUSCULAR; INTRAVENOUS at 02:50

## 2023-08-12 RX ADMIN — KETOROLAC TROMETHAMINE 15 MG: 30 INJECTION, SOLUTION INTRAMUSCULAR; INTRAVENOUS at 02:46

## 2023-08-12 NOTE — ED PROVIDER NOTES
History  Chief Complaint   Patient presents with   • Headache     Pt reports Cluster headaches   • Hypertension     Pt reports elevated BP readings at home. 135's/140's. • Chest Pain     Pt reports chest tightness. "Feels stress related since Monday"     This is a 50-year-old male with past medical history of PE on Eliquis, sarcoidosis, HTN presenting to the ED for evaluation of headache, high blood pressure and chest pain. Patient states he has not been sleeping well due to increased stress and work. Patient states that he has a history of cluster headaches when he does not sleep well and when he is stressed. Patient states headache is located to the left side of the head. He has been taking Tylenol at home without proven of symptoms. He also endorses left-sided chest pain and some shortness of breath. Patient denies any pleurisy. Patient also states that his BPs have been elevated compared to prior. Patient is no longer taking any antihypertensives as his BP improved. Patient states that he has been under a lot of stress and has been increasing caffeine intake secondary to lack of sleep. He denies any fevers or chills, nausea or vomiting, LH. History provided by:  Patient   used: No        Prior to Admission Medications   Prescriptions Last Dose Informant Patient Reported? Taking?    Eliquis 5 MG   Yes Yes   Ventolin  (90 Base) MCG/ACT inhaler   Yes Yes   Sig: TAKE 2 PUFFS BY MOUTH EVERY 4 HOURS AS NEEDED FOR WHEEZE   amLODIPine (NORVASC) 5 mg tablet   Yes Yes   Sig: Take 5 mg by mouth daily   amoxicillin (AMOXIL) 500 mg capsule   Yes No   Sig: TAKE 1 CAPSULE BY MOUTH THREE TIMES A DAY FOR 10 DAYS   Patient not taking: Reported on 11/2/2022   azithromycin (ZITHROMAX) 250 mg tablet Not Taking  Yes No   Sig: TAKE 2 TABLETS BY MOUTH TODAY, THEN TAKE 1 TABLET DAILY FOR 4 DAYS   Patient not taking: Reported on 6/29/2023   butalbital-acetaminophen-caffeine ([facility]) -40 mg per tablet   Yes No   Sig: TAKE 1 TABLET BY MOUTH EVERY 4 HOURS AS NEEDED FOR HEADACHES. famotidine (PEPCID) 20 mg tablet   No No   Sig: Take 1 tablet (20 mg total) by mouth 2 (two) times a day for 10 days   hydrochlorothiazide (HYDRODIURIL) 25 mg tablet Not Taking  Yes No   Sig: TAKE 1 TABLET (25 MG TOTAL) BY MOUTH DAILY. Patient not taking: Reported on 8/12/2023   loratadine (CLARITIN) 10 mg tablet   Yes No   Sig: Take 10 mg by mouth daily   metFORMIN (GLUCOPHAGE-XR) 500 mg 24 hr tablet Not Taking  Yes No   Patient not taking: Reported on 8/12/2023   methylPREDNISolone 4 MG tablet therapy pack   Yes No   Sig: TAKE 6 TABLETS ON DAY 1 AS DIRECTED ON PACKAGE AND DECREASE BY 1 TAB EACH DAY FOR A TOTAL OF 6 DAYS   Patient not taking: Reported on 6/29/2023   omeprazole (PriLOSEC) 40 MG capsule   Yes No   Sig: Take 40 mg by mouth daily   sucralfate (CARAFATE) 1 g tablet   No No   Sig: Take 1 tablet (1 g total) by mouth 4 (four) times a day for 10 days   zolpidem (AMBIEN) 5 mg tablet   Yes Yes   Sig: Take 5 mg by mouth daily as needed      Facility-Administered Medications: None       Past Medical History:   Diagnosis Date   • Left ear hearing loss    • Lymphedema    • Pulmonary embolism (HCC)    • Sarcoidosis        History reviewed. No pertinent surgical history. History reviewed. No pertinent family history. I have reviewed and agree with the history as documented. E-Cigarette/Vaping   • E-Cigarette Use Never User      E-Cigarette/Vaping Substances   • Nicotine No    • THC No    • CBD No    • Flavoring No    • Other No    • Unknown No      Social History     Tobacco Use   • Smoking status: Never   • Smokeless tobacco: Never   Vaping Use   • Vaping Use: Never used   Substance Use Topics   • Alcohol use: Not Currently   • Drug use: Never       Review of Systems   Respiratory: Positive for shortness of breath. Cardiovascular: Positive for chest pain. Neurological: Positive for headaches.    All other systems reviewed and are negative. Physical Exam  Physical Exam  Vitals reviewed. Constitutional:       General: He is not in acute distress. Appearance: Normal appearance. He is well-developed and well-groomed. He is not ill-appearing, toxic-appearing or diaphoretic. HENT:      Head: Normocephalic and atraumatic. Right Ear: External ear normal.      Left Ear: External ear normal.      Nose: Nose normal. No congestion or rhinorrhea. Mouth/Throat:      Mouth: Mucous membranes are moist.      Pharynx: Oropharynx is clear. No oropharyngeal exudate or posterior oropharyngeal erythema. Eyes:      General: No scleral icterus. Right eye: No discharge. Left eye: No discharge. Extraocular Movements: Extraocular movements intact. Conjunctiva/sclera: Conjunctivae normal.   Cardiovascular:      Rate and Rhythm: Normal rate and regular rhythm. Pulses: Normal pulses. Heart sounds: No murmur heard. No friction rub. No gallop. Pulmonary:      Effort: Pulmonary effort is normal. No respiratory distress. Breath sounds: Normal breath sounds. No wheezing, rhonchi or rales. Abdominal:      General: Abdomen is flat. There is no distension. Palpations: Abdomen is soft. Tenderness: There is no abdominal tenderness. There is no right CVA tenderness, left CVA tenderness, guarding or rebound. Musculoskeletal:         General: No deformity. Normal range of motion. Cervical back: Normal range of motion and neck supple. Skin:     General: Skin is warm and dry. Coloration: Skin is not jaundiced or pale. Findings: No rash. Neurological:      General: No focal deficit present. Mental Status: He is alert and oriented to person, place, and time. GCS: GCS eye subscore is 4. GCS verbal subscore is 5. GCS motor subscore is 6. Cranial Nerves: Cranial nerves 2-12 are intact. Sensory: Sensation is intact.       Motor: Motor function is intact. Gait: Gait is intact. Comments: Patient without neurologic deficit; CN II-XII grossly intact, EOMI, PERRLA, strength 5/5 in all extremities, sensation grossly intact. Patient a and o x 3. Coordination intact. Psychiatric:         Mood and Affect: Mood normal.         Behavior: Behavior normal. Behavior is cooperative.          Vital Signs  ED Triage Vitals [08/12/23 0016]   Temperature Pulse Respirations Blood Pressure SpO2   98.5 °F (36.9 °C) 73 18 151/91 99 %      Temp Source Heart Rate Source Patient Position - Orthostatic VS BP Location FiO2 (%)   Oral Monitor Sitting Left arm --      Pain Score       6           Vitals:    08/12/23 0016 08/12/23 0254   BP: 151/91 146/94   Pulse: 73 65   Patient Position - Orthostatic VS: Sitting Sitting         Visual Acuity      ED Medications  Medications   ketorolac (TORADOL) injection 15 mg (15 mg Intravenous Given 8/12/23 0246)   metoclopramide (REGLAN) injection 10 mg (10 mg Intravenous Given 8/12/23 0250)   diphenhydrAMINE (BENADRYL) injection 25 mg (25 mg Intravenous Given 8/12/23 0249)   sodium chloride 0.9 % bolus 1,000 mL (0 mL Intravenous Stopped 8/12/23 0346)       Diagnostic Studies  Results Reviewed     Procedure Component Value Units Date/Time    HS Troponin 0hr (reflex protocol) [[id number]]  (Normal) Collected: 08/12/23 0244    Lab Status: Final result Specimen: Blood from Arm, Left Updated: 08/12/23 0325     hs TnI 0hr 3 ng/L     Comprehensive metabolic panel [[id number]] Collected: 08/12/23 0244    Lab Status: Final result Specimen: Blood from Arm, Left Updated: 08/12/23 0323     Sodium 135 mmol/L      Potassium 3.9 mmol/L      Chloride 103 mmol/L      CO2 25 mmol/L      ANION GAP 7 mmol/L      BUN 18 mg/dL      Creatinine 1.01 mg/dL      Glucose 113 mg/dL      Calcium 9.5 mg/dL      AST 18 U/L      ALT 17 U/L      Alkaline Phosphatase 52 U/L      Total Protein 7.0 g/dL      Albumin 4.3 g/dL      Total Bilirubin 0.40 mg/dL eGFR 81 ml/min/1.73sq m     Narrative:      [facility] guidelines for Chronic Kidney Disease (CKD):   •  Stage 1 with normal or high GFR (GFR > 90 mL/min/1.73 square meters)  •  Stage 2 Mild CKD (GFR = 60-89 mL/min/1.73 square meters)  •  Stage 3A Moderate CKD (GFR = 45-59 mL/min/1.73 square meters)  •  Stage 3B Moderate CKD (GFR = 30-44 mL/min/1.73 square meters)  •  Stage 4 Severe CKD (GFR = 15-29 mL/min/1.73 square meters)  •  Stage 5 End Stage CKD (GFR <15 mL/min/1.73 square meters)  Note: GFR calculation is accurate only with a steady state creatinine    CBC and differential [[id number]]  (Abnormal) Collected: 08/12/23 0244    Lab Status: Final result Specimen: Blood from Arm, Left Updated: 08/12/23 0303     WBC 3.10 Thousand/uL      RBC 5.46 Million/uL      Hemoglobin 14.4 g/dL      Hematocrit 43.3 %      MCV 79 fL      MCH 26.4 pg      MCHC 33.3 g/dL      RDW 13.9 %      MPV 9.6 fL      Platelets 775 Thousands/uL      nRBC 0 /100 WBCs      Neutrophils Relative 69 %      Immat GRANS % 0 %      Lymphocytes Relative 20 %      Monocytes Relative 11 %      Eosinophils Relative 0 %      Basophils Relative 0 %      Neutrophils Absolute 2.13 Thousands/µL      Immature Grans Absolute 0.00 Thousand/uL      Lymphocytes Absolute 0.61 Thousands/µL      Monocytes Absolute 0.35 Thousand/µL      Eosinophils Absolute 0.01 Thousand/µL      Basophils Absolute 0.00 Thousands/µL                  XR chest 1 view portable   ED Interpretation by Brittanie Gordon PA-C (08/12 0667)   No acute cardiopulmonary disease as interpreted by me at this time. Compared to prior.                  Procedures  ECG 12 Lead Documentation Only    Date/Time: 8/12/2023 2:15 AM    Performed by: Brittanie Gordon PA-C  Authorized by: Brittanie Gordon PA-C    Indications / Diagnosis:  Chest pain  ECG reviewed by me, the ED Provider: yes (Also reviewed by Dr. Richmond Andujar)    Patient location:  ED  Previous ECG:     Previous ECG: Compared to current    Comparison to cardiac monitor: No    Interpretation:     Interpretation: normal    Quality:     Tracing quality:  Limited by artifact  Rate:     ECG rate:  68    ECG rate assessment: normal    Rhythm:     Rhythm: sinus rhythm    Ectopy:     Ectopy: none    QRS:     QRS axis:  Normal    QRS intervals:  Normal  Conduction:     Conduction: normal    ST segments:     ST segments:  Normal  T waves:     T waves: normal               ED Course  ED Course as of 08/12/23 0721   Sat Aug 12, 2023   0311 WBC(!): 3.10  baseline   0332 hs TnI 0hr: 3             HEART Risk Score    Flowsheet Row Most Recent Value   Heart Score Risk Calculator    History 0 Filed at: 08/12/2023 0332   ECG 0 Filed at: 08/12/2023 8339   Age 1 Filed at: 08/12/2023 0332   Risk Factors 1 Filed at: 08/12/2023 0332   Troponin 0 Filed at: 08/12/2023 0332   HEART Score 2 Filed at: 08/12/2023 7792                                      Medical Decision Making      DDx including but not limited to: tension headache, cluster headache, migraine, ACS, chest wall pain, costochondritis, pericarditis, hypertension, hypertensive urgency, hypertensive crisis, renal failure, metabolic abnormality. Patient presenting to the ED for evaluation of chest pain, shortness of breath, and headache. Patient is neurologically intact on exam.  Patient in no acute distress at this time. Patient vital signs are stable. Will order CBC for evaluation of anemia and infection, CMP for evaluation of LFTs, kidney function and electrolyte abnormalities. Will order troponin for evaluation of heart strain, EKG for evaluation of arrhythmia and ACS. Will treat symptomatically with IV fluids, Toradol, Reglan and Benadryl. Prior to discharge, discharge instructions were discussed with patient at bedside. Patient was provided both verbal and written instructions. Patient is understanding of the discharge instructions and is agreeable to plan of care.  Return precautions were discussed with patient bedside, patient verbalized understanding of signs and symptoms that would necessitate return to the ED. All questions were answered. Patient was comfortable with the plan of care and discharged to home. Dispo: discharge home with follow up to PCP. Patient stable, in no acute distress and non-toxic at discharge. Headache: acute illness or injury  Leukopenia: acute illness or injury  Non-cardiac chest pain: acute illness or injury  Amount and/or Complexity of Data Reviewed  Labs: ordered. Decision-making details documented in ED Course. Radiology: ordered and independent interpretation performed. Risk  Prescription drug management. Disposition  Final diagnoses:   Headache   Leukopenia   Non-cardiac chest pain     Time reflects when diagnosis was documented in both MDM as applicable and the Disposition within this note     Time User Action Codes Description Comment    8/12/2023  3:39 AM Martina Smith Add [R51.9] Headache     8/12/2023  3:39 AM Martina Smith Add [D72.819] Leukopenia     8/12/2023  3:40 AM Martina Smith Add [R07.89] Non-cardiac chest pain       ED Disposition     ED Disposition   Discharge    Condition   Stable    Date/Time   Sat Aug 12, 2023  3:39 AM    Comment   Ed Juan Carlos Wilhelm. discharge to home/self care.                Follow-up Information     Follow up With Specialties Details Why [facility] Schedule an appointment as soon as possible for a visit  As needed [id number] [address] [phone]            Discharge Medication List as of 8/12/2023  3:41 AM      CONTINUE these medications which have NOT CHANGED    Details   amLODIPine (NORVASC) 5 mg tablet Take 5 mg by mouth daily, Starting Thu 10/13/2022, Historical Med      Eliquis 5 MG Starting Mon 10/31/2022, Historical Med      Ventolin  (90 Base) MCG/ACT inhaler TAKE 2 PUFFS BY MOUTH EVERY 4 HOURS AS NEEDED FOR WHEEZE, Historical Med      zolpidem (AMBIEN) 5 mg tablet Take 5 mg by mouth daily as needed, Starting Fri 7/15/2022, Historical Med      amoxicillin (AMOXIL) 500 mg capsule TAKE 1 CAPSULE BY MOUTH THREE TIMES A DAY FOR 10 DAYS, Historical Med      azithromycin (ZITHROMAX) 250 mg tablet TAKE 2 TABLETS BY MOUTH TODAY, THEN TAKE 1 TABLET DAILY FOR 4 DAYS, Historical Med      butalbital-acetaminophen-caffeine (FIORICET,ESGIC) -40 mg per tablet TAKE 1 TABLET BY MOUTH EVERY 4 HOURS AS NEEDED FOR HEADACHES., Historical Med      famotidine (PEPCID) 20 mg tablet Take 1 tablet (20 mg total) by mouth 2 (two) times a day for 10 days, Starting Sat 5/7/2022, Until Tue 5/17/2022, Print      hydrochlorothiazide (HYDRODIURIL) 25 mg tablet TAKE 1 TABLET (25 MG TOTAL) BY MOUTH DAILY. , Historical Med      loratadine (CLARITIN) 10 mg tablet Take 10 mg by mouth daily, Starting Thu 3/31/2022, Until Fri 3/31/2023, Historical Med      metFORMIN (GLUCOPHAGE-XR) 500 mg 24 hr tablet Starting Tue 6/27/2023, Historical Med      methylPREDNISolone 4 MG tablet therapy pack TAKE 6 TABLETS ON DAY 1 AS DIRECTED ON PACKAGE AND DECREASE BY 1 TAB EACH DAY FOR A TOTAL OF 6 DAYS, Historical Med      omeprazole (PriLOSEC) 40 MG capsule Take 40 mg by mouth daily, Starting Thu 10/13/2022, Historical Med      sucralfate (CARAFATE) 1 g tablet Take 1 tablet (1 g total) by mouth 4 (four) times a day for 10 days, Starting Sat 5/7/2022, Until Tue 5/17/2022, Print             No discharge procedures on file.     PDMP Review     None          ED Provider  Electronically Signed by [facility] HospitalCHELSEA  08/12/23 1790

## 2023-08-15 ENCOUNTER — OFFICE VISIT (OUTPATIENT)
Dept: DENTISTRY | Facility: CLINIC | Age: 58
End: 2023-08-15

## 2023-08-15 VITALS — HEART RATE: 72 BPM | DIASTOLIC BLOOD PRESSURE: 87 MMHG | TEMPERATURE: 97 F | SYSTOLIC BLOOD PRESSURE: 134 MMHG

## 2023-08-15 DIAGNOSIS — K08.50 DEFECTIVE DENTAL RESTORATION: Primary | ICD-10-CM

## 2023-08-15 PROCEDURE — D2335 RESIN-BASED COMPOSITE - 4 OR MORE SURFACES OR INVOLVING INCISAL ANGLE (ANTERIOR): HCPCS | Performed by: DENTIST

## 2023-08-15 NOTE — DENTAL PROCEDURE DETAILS
Limited Oral Evaluation and Composite Filling #8    Ed L Jesus Laureano. presents for composite filling. PMH reviewed, no changes. ASA:II   Pain score: 0  Chief complain: "chipped front tooth and lost old bonding"  HPI: patient reported he chipped his front tooth and is pointing to tooth #8. Patient reported he had a bonding filling done on this tooth around 10-15 years ago. Patient reported history of teeth grinding. Patient reported no pain but some discomfort and esthetic concern. Radiographs: current. Limited Oeal Exam: indicates tooth #8 lost existing old composite filling DIFL. Severe occlusal/incisal attrition associated with deep traumatic overbite. Patient is aware of it. Informed patient providing anew composite restoration could be but is not guaranteed and may fell again due to the fact of his bite and grinding/bruxism. Patient aware and understood. Patient agreed and consent treatment. Discussed with patient need for RCT if pulp exposure occurs or in future if pulp is inflamed. Pt understands and consents. Applied topical benzocaine only. Existing Prep of tooth #8 DIFL is refined with 245 lio on high speed. Placed mylar strip. Isolation with cotton rolls and dri-angles    Etch with 37% H2PO4, rinse, dry. Applied Adhese with 20 second scrub once, gentle air dry and light cured for 10s. Restored with Tetric bulk paolo shade A2 and light cured. Refined with finishing burs, polished with enhance point. Verified occlusion and contacts. POI is given. Pt left very happy, satisfied and ambulatory. NV: Try-in of UP and LR partial dentures. (Pt.  Requested adjustment of length of existing crown #9 to match #8)

## 2023-08-22 ENCOUNTER — OFFICE VISIT (OUTPATIENT)
Dept: DENTISTRY | Facility: CLINIC | Age: 58
End: 2023-08-22

## 2023-08-22 VITALS — DIASTOLIC BLOOD PRESSURE: 97 MMHG | SYSTOLIC BLOOD PRESSURE: 141 MMHG | TEMPERATURE: 98.2 F | HEART RATE: 76 BPM

## 2023-08-22 DIAGNOSIS — K08.409 PARTIAL EDENTULISM, UNSPECIFIED EDENTULISM CLASS: Primary | ICD-10-CM

## 2023-08-22 PROCEDURE — WIS5003 WAX TRY-IN

## 2023-08-22 NOTE — DENTAL PROCEDURE DETAILS
Wax try in    Pt presents for wax try in for upper and lower partial acrylic dentures. PMH reviewed, no changes. ASA 2, pain level none. Wax try in with upper and lower future valplast tried intraorally. Confirmed proper tissue support and occlusion. Pt confirmed satisfaction with shade, shape, function and esthetics via pt mirror. Pt informed he will have valplast clasps on his teeth which will secure denture, because at this moment he has no clasps and this is a wax try in and this is why it is loose. Gingival shade was selected last time  Called [facility] lab to have case expedited. Talked to Myles Yusuf, case expected on Friday August 31. Delivery scheduled 9/1. Pt also requested he would like to make the payment after the denture is inserted. Pt left satisfied and ambulatory.      NV: delivery after 8/31  Dr Jose Alejandro Palumbo was present and participated in treatment  Nora Ashby DMD

## 2023-09-01 ENCOUNTER — OFFICE VISIT (OUTPATIENT)
Dept: DENTISTRY | Facility: CLINIC | Age: 58
End: 2023-09-01

## 2023-09-01 DIAGNOSIS — K08.109 TEETH MISSING: Primary | ICD-10-CM

## 2023-09-01 PROCEDURE — LAB01 LAB FEE DENTURE

## 2023-09-01 PROCEDURE — D5226: HCPCS

## 2023-09-01 NOTE — DENTAL PROCEDURE DETAILS
Nirmala Moran 61 y.o male presents for upper and lower resin partial dentures insertion. PMH reviewed, no changes, ASA II. Patient reports no pain. Dentures tried intraorally. Adequate retention verified, no rocking. Adequate phonetics verified. Patient comfortable with denture base and reported no soreness. Adjusted occlusion as necessary. Noted supra-eruption of maxillary teeth especially #13 which required significant adjustment to opposing denture teeth for balanced occlusion. Confirmed patient comfortable with the bite. Overall prognosis is Good. Patient also expressed interest in making his #8 and 9 look symmetrical. Informed patient that putting a crown on #8 is risky because it will accelerate the wear on his lower mandibular teeth further. Advised to see  if he is interested in oral rehabilitation. Next visit:  denture follow up as needed.

## 2023-09-06 ENCOUNTER — OFFICE VISIT (OUTPATIENT)
Dept: DENTISTRY | Facility: CLINIC | Age: 58
End: 2023-09-06

## 2023-09-06 VITALS — TEMPERATURE: 97.8 F

## 2023-09-06 DIAGNOSIS — K08.109 TEETH MISSING: Primary | ICD-10-CM

## 2023-09-06 PROCEDURE — WIS5009 POST-OP DENTURE ADJUSTMENT

## 2023-09-06 NOTE — DENTAL PROCEDURE DETAILS
Kenna Thomas presents for denture adjustment 1 week following insertion of lower Valplast partial. PMH reviewed, no changes, ASA II.   CC: My denture is sore, I haven't been able to wear it. Findings: 1-2 mm size purple bruised area on the gingiva lower left lingual area. Approximately in relation to #28 on the partial.   PIP paste used to identify pressure area, adjusted with acrylic bur. Patient noticed an improvement.  Patient informed that it takes time for his tissue to adapt to the denture and some initial soreness is normal.  NV: denture adjustment if necessary

## 2023-09-25 ENCOUNTER — OFFICE VISIT (OUTPATIENT)
Dept: DENTISTRY | Facility: CLINIC | Age: 58
End: 2023-09-25

## 2023-09-25 VITALS — TEMPERATURE: 97.8 F

## 2023-09-25 DIAGNOSIS — K08.89: Primary | ICD-10-CM

## 2023-09-25 PROCEDURE — WIS5009 POST-OP DENTURE ADJUSTMENT

## 2023-09-25 NOTE — PROGRESS NOTES
Patient here with complaints of sore spot caused by lower flexible partial adjacent to #29 lingual gingiva. Partial adjusted on the lingual flange adjacent to #29 until patient comfortable and satisfied and occlusion stable.     NV: 2-3 day follow up

## 2023-10-02 ENCOUNTER — OFFICE VISIT (OUTPATIENT)
Dept: DENTISTRY | Facility: CLINIC | Age: 58
End: 2023-10-02

## 2023-10-02 VITALS — TEMPERATURE: 97.8 F

## 2023-10-02 DIAGNOSIS — K08.109 TEETH MISSING: Primary | ICD-10-CM

## 2023-10-02 PROCEDURE — WIS5009 POST-OP DENTURE ADJUSTMENT

## 2023-10-02 NOTE — PROGRESS NOTES
Patient here with complaints of sore lower  Lingual to #27 due to flexible lower partial .    Denture adjusted until patient comfortable and satisfied and occlusion stable.     NV: 1 week follow up / flexible partial adjustment

## 2023-10-04 ENCOUNTER — OFFICE VISIT (OUTPATIENT)
Dept: DENTISTRY | Facility: CLINIC | Age: 58
End: 2023-10-04

## 2023-10-04 VITALS — SYSTOLIC BLOOD PRESSURE: 142 MMHG | DIASTOLIC BLOOD PRESSURE: 95 MMHG | HEART RATE: 84 BPM

## 2023-10-04 DIAGNOSIS — Z01.20 ENCOUNTER FOR DENTAL EXAM AND CLEANING W/O ABNORMAL FINDINGS: Primary | ICD-10-CM

## 2023-10-04 PROCEDURE — D1110 PROPHYLAXIS - ADULT: HCPCS

## 2023-10-05 NOTE — DENTAL PROCEDURE DETAILS
Katerina Flores. is a 60 y.o. male who presents for prophy only, no exam. Pt elected 3 month cleaning intervals. Pain score: 0 out of 10. Reviewed medical history, allergies, and medications. Pt denies any changes. Pt significant/pertinent medical history includes: type 2 diabetes . Pt is ASA II. Updated perio chart. Pt had deeper pockets in #17 and #18, other pocket depths <= 3 mm. Oral hygiene adequate. Removed plaque and small amounts of calculus with hand scalers. Polished with prophy paste. Flossed. Pt left ambulatory and alert. NV: Denture adjustments as needed. NNV: Prophy with exam in 3 months. Attending: Dr. Miles Samaniego was present in clinic.

## 2024-01-03 ENCOUNTER — OFFICE VISIT (OUTPATIENT)
Dept: DENTISTRY | Facility: CLINIC | Age: 59
End: 2024-01-03

## 2024-01-03 VITALS — TEMPERATURE: 98.4 F | DIASTOLIC BLOOD PRESSURE: 90 MMHG | SYSTOLIC BLOOD PRESSURE: 131 MMHG | HEART RATE: 101 BPM

## 2024-01-03 DIAGNOSIS — S02.5XXA CLOSED FRACTURE OF TOOTH, INITIAL ENCOUNTER: Primary | ICD-10-CM

## 2024-01-03 PROCEDURE — D2335 RESIN-BASED COMPOSITE - 4 OR MORE SURFACES OR INVOLVING INCISAL ANGLE (ANTERIOR): HCPCS

## 2024-01-03 NOTE — DENTAL PROCEDURE DETAILS
Composite Filling #8 MIDFL    Ed TREVOR Kruse Jr. presents to [facility] dental clinic.    CC - I chipped my tooth eating popcorn    PMH reviewed, patient reports recent left rotator cuff surgery. ASA II. Pain level 0.    Findings: #8 fractured MIDFL surfaces where previous composite bonding was done.    Patient says the bonding on that tooth was done about 3 years ago.  Informed patient that the definitive treatment is a crown, due to large restoration and high chance of the filling or tooth fracturing again.  Patient understood and wants the crown, but requests composite bonding temporarily for aesthetics.  Reviewed proposed treatment plan of immediate composite bonding #8 for aesthetics, and then a crown on #8.    No anesthesia utilized with patient consent.    Removed old composite and prepped with fine football lio on high speed.  Beveled facial margin with fine flame shaped lio on high speed.   Isolation with cotton rolls.  Etch with 37% H2PO4, rinse, dry.  Applied Adhese with 20 second scrub once, gentle air dry and light cured for 10s.  Used clear Milar strips to avoid bonding to adjacent teeth   Restored mesial, distal, and lingual portions of the prep with Tetric Flowable shade A2.  Restored facial, incisal surfaces and remainder of prep with Tetric Evoceram shade A2.  Refined with finishing burs, polished with enhance point.  Verified occlusion and contacts with floss.  Patient verified aesthetics with mirror and was dismissed satisfied.    NV: [place] prep #8

## 2024-01-26 ENCOUNTER — OFFICE VISIT (OUTPATIENT)
Dept: DENTISTRY | Facility: CLINIC | Age: 59
End: 2024-01-26

## 2024-01-26 VITALS — HEART RATE: 77 BPM | DIASTOLIC BLOOD PRESSURE: 90 MMHG | SYSTOLIC BLOOD PRESSURE: 144 MMHG

## 2024-01-26 DIAGNOSIS — K08.109 TEETH MISSING: Primary | ICD-10-CM

## 2024-01-26 PROCEDURE — WIS5005 REMAKE

## 2024-01-26 NOTE — DENTAL PROCEDURE DETAILS
Clyde Kruse presents to [facility] dental clinic for evaluation.  PMH reviewed, no changes, ASA II.    CC - I lost my lower partial denture    Patient did not want to go into details as to how it got lost, but assured that he cannot retreive the denture.  Found the master casts from his case. Final impressions were from 8/10/2023 and no notable changes in his lower dentition were present.  Called [facility] and asked if it's possible to send them the master casts and have them start at a later step. [facility] said Yes and they could either send us back a wax-try in or final processed denture.  Asked Ed his preference and he said he'd like the final denture made in one step with no try-in.  Sent Rx to [facility] sent for new lower Valplast RPD to replace teeth #19, 20, 21, 28, 31  Patient understood that he needs to pay for the new denture.    Attending: Dr. Grover    NV: Lower RPD Delivery  NV2: #8 [place] prep

## 2024-01-29 ENCOUNTER — TELEPHONE (OUTPATIENT)
Dept: DENTISTRY | Facility: CLINIC | Age: 59
End: 2024-01-29

## 2024-01-29 NOTE — TELEPHONE ENCOUNTER
Spoke to pt. about amount to be paid for lower denture pt. believes the price is wrong because he states he paid $402 for both top and bottom. Advised I would look into it and speak to Harley

## 2024-03-11 ENCOUNTER — OFFICE VISIT (OUTPATIENT)
Dept: DENTISTRY | Facility: CLINIC | Age: 59
End: 2024-03-11

## 2024-03-11 VITALS — SYSTOLIC BLOOD PRESSURE: 143 MMHG | DIASTOLIC BLOOD PRESSURE: 87 MMHG | TEMPERATURE: 99.3 F | HEART RATE: 93 BPM

## 2024-03-11 DIAGNOSIS — Z01.20 ENCOUNTER FOR DENTAL EXAMINATION: Primary | ICD-10-CM

## 2024-03-11 PROCEDURE — D0191 ASSESSMENT OF A PATIENT: HCPCS

## 2024-03-11 NOTE — DENTAL PROCEDURE DETAILS
-Patient presents for crown prep #8.   -However, patient demanded clarification of treatment costs.  -His tx plan includes #8 ceramic crown () and lower valplast RPD ().  -Patient believed that the price given for the RPD is not accurate.  -Confirmed with billing that price for RPD is accurate; Printed a breakdown of all the fees, handed it to the patient and explained them including the SFS discounted procedure fees and fixed Lab fees.   -Patient understood but still was not fully convinced that the cost of the RPD is accurate; he cites the fact that he paid this amount for two RPDs the last time, but now we are charging the same amount for a single one.  -Explained that the upper RPD inserted 9/1/2023 was a remake and never charged out (explained in note 8/4/2023).   -Therefore, he paid for one RPD last time, but got two since the upper was covered by us.   -Regarding the crown #8, patient also understood the price and wants the crown.  -Patient asked if we can insert the RPD and do crown prep #8 at the same time.  -I said Yes, if the denture is back.  -Called [facility] and got estimate delivery for the RPD - morning of March 22nd.  -Due to the long discussion, there was insufficient time for the crown prep today.  -Since my last day of rotation at [facility] happens to also be March 22nd, I agreed to see Ed again for an afternoon appointment that day, and also that we need the full appointment time and finances must be worked out prior to the appointment.     NV: Broussard prep #8 and lower RPD insertion  
no

## 2024-03-22 ENCOUNTER — OFFICE VISIT (OUTPATIENT)
Dept: DENTISTRY | Facility: CLINIC | Age: 59
End: 2024-03-22

## 2024-03-22 VITALS — SYSTOLIC BLOOD PRESSURE: 135 MMHG | DIASTOLIC BLOOD PRESSURE: 92 MMHG | TEMPERATURE: 98.5 F | HEART RATE: 68 BPM

## 2024-03-22 DIAGNOSIS — K08.109 TEETH MISSING: Primary | ICD-10-CM

## 2024-03-22 PROCEDURE — D5226: HCPCS

## 2024-03-22 NOTE — DENTAL PROCEDURE DETAILS
Denture Delivery    Ed TREVOR Kruse Jr. 58 y.o. male presents to [facility] for delivery Lower Flexible RPD   PMH reviewed, no changes, ASA II  Pain level 0/10    Diagnosis: Missing teeth    Consent: Tx plan reviewed. Patient understands and consents verbally.    Procedure details:  Denture tried intraorally  Confirmed adequate retention, phonetics, occlusion, and aesthetics with patient mirror  Occlusion verified and adjusted.  Provided denture home care instructions and storage case.     Patient left ambulatory and alert.    NV: Denture adjustment as needed

## 2024-03-23 RX ORDER — AZITHROMYCIN 500 MG/1
500 TABLET, FILM COATED ORAL DAILY
Refills: 0 | OUTPATIENT
Start: 2024-03-23

## 2024-03-23 RX ORDER — CLINDAMYCIN HYDROCHLORIDE 300 MG/1
300 CAPSULE ORAL 4 TIMES DAILY
Refills: 0 | OUTPATIENT
Start: 2024-03-23

## 2024-03-23 NOTE — PROGRESS NOTES
Comprehensive Exam    Ed TREVOR Aixa . 58 y.o. male presents with {ZZminor:62379} to {ZZclinicLocations:86218} for **  PMH reviewed, no changes, ASA {ZZroman:15711}. Pt signficiant for ***  Pain level {FK4bj79:06737}/10    Chief complaint:     Consent:  Reviewed procedures involved with comprehensive exam including radiographs, oral exam, and periodontal probing.   Patient understands and consents.    Radiographs: {ZZradiographs:46685}    Periodontal exam  Horizontal bone loss  UR - {ZZBoneLoss:46796}  UL - {ZZBoneLoss:91738}  LL - {ZZBoneLoss:88658}  LR - {ZZBoneLoss:50194}  Vertical bone loss - {ZZnoneOR#:40758}  Subgingival calculus - {ZZlocalORgeneral:27447}  BOP - {ZZlocalORgeneral:73409}  Mobility - {ZZnoneOR#:19330}  Furcation involvements - {ZZnoneOR#:91653}  Occlusal trauma - {ZZnoneOR#:16042}  Smoker - {ZZyesno:54036}  Diabetic - {ZZyesno:24624}  Periodontal Stage: {ZZperioStage:35949}  Periodontal Grade: {ZZperioGrade:44193}  Periodontal Plan: {ZZperioPlan:02459} UL and LL    Caries exam:   {ZZnoneOR#:43834}  Teeth with high change of needing RCT? {ZZnoneOR#:23930}    Oral cancer and soft tissue exam:  No abnormalities detected    Occlusal assessment:  VDO / restorative space - {ZZvdo:32013}   AP Classification -   Right: Canine {ZZorthoAP:32466}; Molar {ZZorthoAP:42417}  Left: Canine {ZZorthoAP:28225}; Molar {ZZorthoAP:83758}  Overbite - * mm  Overjet -  * %  Supra-eruptions or drifting - {ZZnoneOR#:27781}  Crossbites - {ZZnoneOR#:97025}  Recommended for orthodontic referral? {ZZyesno:67893}  Recommended for orthodontic consult at [place]? {Rossana:69669}    Case difficulty assessment:   Criteria    Cumulative level   Medical History ASA I ASA II ASA III-V    Anesthesia No history of anesthesia problems Vasoconstrictor Intolerance History of difficulty achieving anesthesia    Patient Disposition Cooperative and compliant Anxious but cooperative Uncooperatie    Ability to Open Mouth No limitation Slight  limitation in opening Significant limitation in opening    Gag Reflex None Gags occasionally with radiographs/treatment Extreme gag reflex which has compromised past dental treatment    Emergency Condition Minimal pain or swelling Moderate pain or swelling Severe pain or swelling    Caries Risk 1 to 3 Proximal Cavities 4 to 6 Proximal Cavities >6 Proximal Cavities    Missing Teeth No missing teeth 1-3 Non-canine Missing teeth >3 Missing teeth or missing any canine    Periodontal Stage Healthy Stage 1 or 2  Stage 3 or 4    Periodontal Grade Grade A Grade B Grade C    Diagnosis Signs and Symptoms consistent with recognized pulpal and periodontal conditions Extensive differential diagnosis of usual signs and symptoms required Confusing and complex signs and symptoms: difficult diagnosis.  History of chronic oral/facial pain.    Radiographic Difficulties Minimal Difficulty Obtaining/Interpreting Radiographs Moderate Difficulty: high floor of mouth, narrow or low palate, carlos, etc. Extreme Difficulty: Superimposed anatomic structures, etc.    Teeth Position Anterior/Premolar  Slight inclination <10o  Slight Rotation <10o 1st molar   Mod. Inclination 10o-30o  Mod. Rotation 10o-30o 2nd or 3rd molar   Extr. Incliniation >30o  Extr. Rotation <30o    Teeth Isolation Routine rubber dam placement  Simple pretreatment modification for rubber dam placement Extensive pretreatment modification required for rubber dam isolation.    Teeth Morphology Normal original crown morphology.  Roots have slight to no curvature (<10o)  Closed apex <1mm in diameter  Canals visible and not reduced in size.  No Root resorption. Full coverage Restorations, Porcelain restorations, Bridge abutments.  Moderate deviation from normal tooth/root form (taurodontism, dens-in-dente, etc.).  Roots have moderate curvature (10-30o).  Crown axis differs moderately from root axis.  Apical opening 1-1.5mm in diameter. Canals and chambers visible but reduced in  size, pulp stones.  Minimal apical root resorption. Restorations do not reflect original anatomy/alignment.  Significant deviation from normal tooth/root form (fusion, gemination, gabino cusps, etc.).  Extreme root curvature (>30o) or S-shaped curve.  Anterior tooth or Mandibular Premolar with 2 roots.  Maxillary premolar with 3 roots.  Canal divides in middle or apical 3rd.  Tooth length >25mm.  Open apex >1.5mm.  Indistinct canals or not visible.  Extensive apical, internal, or external resorption.    Malocclusion Class I Corrected (dental or skeletal) Class II or III Non-Corrected Class II or III    Alveolar Ridge Morphology None to Mild Atrophy Moderate Atrophy Severe Atrophy    Occlusal Stability Requirements Stable and equal intensity stops on all teeth in centric relation  Anterior guidance is in harmony with the envelope of function.  All posterior teeth disclude during mandibular protrusive movement.  All posterior teeth disclude on the non-working side during mandibular lateral movement.  All posterior teeth disclude on the working side during mandibular lateral movement. 1 to 2 requirements are compromised 3 to 5 requirements are compromised    Lemay to Occlusal Stability Correct interarch relationships  Correct crown angulation (tip).  Correct crown inclination (torque)  No rotations.  Tight contact points. 1 to 2 keys are compromised 3 to 5 Keys are compromiseed    Case complexity rating = Cumulative level / (60 x 100) = ** => {ZZcaseDifficulty:08985}    Next visit: ***

## 2024-03-27 ENCOUNTER — OFFICE VISIT (OUTPATIENT)
Dept: DENTISTRY | Facility: CLINIC | Age: 59
End: 2024-03-27

## 2024-03-27 VITALS — TEMPERATURE: 97.3 F | SYSTOLIC BLOOD PRESSURE: 153 MMHG | DIASTOLIC BLOOD PRESSURE: 101 MMHG | HEART RATE: 73 BPM

## 2024-03-27 DIAGNOSIS — K08.109 TEETH MISSING: Primary | ICD-10-CM

## 2024-03-27 PROCEDURE — WIS5009 POST-OP DENTURE ADJUSTMENT

## 2024-03-28 NOTE — DENTAL PROCEDURE DETAILS
Partial Denture Adjustment    Pt presents for Partial denture adjustment.      Lower Partial Denture seated but patient noted discomfort on lingual area adjacent to #29. Marked sore spot with Tompson stick, transferred area to partial. Removed area of irritation with flexible carbide bur. Pt stated he would prefer to make an appointment in 1 week for a second adjustment. Pt understood and left in good spirits.     NV: Adjustment if necessary in one week

## 2024-04-04 ENCOUNTER — OFFICE VISIT (OUTPATIENT)
Dept: DENTISTRY | Facility: CLINIC | Age: 59
End: 2024-04-04

## 2024-04-04 VITALS — TEMPERATURE: 97.6 F | SYSTOLIC BLOOD PRESSURE: 126 MMHG | HEART RATE: 71 BPM | DIASTOLIC BLOOD PRESSURE: 83 MMHG

## 2024-04-04 DIAGNOSIS — K06.2 DENTURE IRRITATION: Primary | ICD-10-CM

## 2024-04-04 PROCEDURE — WIS5009 POST-OP DENTURE ADJUSTMENT

## 2024-04-04 NOTE — DENTAL PROCEDURE DETAILS
Denture Adjustment    Ed TREVOR Kruse Jr. 58 y.o. male presents with self to [facility] for Denture Adjustment. Pt only brought lower partial with him today.  PMH reviewed, no changes, ASA II  Pain level 3/10    Diagnosis: History of denture delivery 2 weeks ago    Subjective report:  Patient notes discomfort in #27 buccal sulcus area at border of clasp    Procedure details:  Evaluated edentulous areas for redness or sore spots.  Used light body PVS on intaglio surface of lower to identify pressure spots, and adjusted with acrylic bur.   Checked occlusion and adjusted with acrylic bur.    Informed patient that discomfort may persist while the soft tissue in the adjusted areas heal.  Recommended full-time denture wear for 2 weeks and call and schedule for another adjustment if discomfort persists.    Pt also pointed out sore at bottom of vestibule #27, stated he noticed it only when his partials were delivered. Area was not close to borders of partial, hence sore is unrelated to the partial. Pt was offered to take a PA radiograph for peace of mind that there is no infection. Radiograph showed no periapical radiolucency, no remarkable findings, pt informed it should self resolve.    Patient dismissed ambulatory and alert     Attending: Dr. Rasheed examined pt.    NV: Recall appt  NNV: Denture adjustment as needed  NNNV: Impressions for occlusal guard when pt ready

## 2024-04-05 ENCOUNTER — OFFICE VISIT (OUTPATIENT)
Dept: DENTISTRY | Facility: CLINIC | Age: 59
End: 2024-04-05

## 2024-04-05 VITALS — HEART RATE: 68 BPM | DIASTOLIC BLOOD PRESSURE: 73 MMHG | SYSTOLIC BLOOD PRESSURE: 123 MMHG | TEMPERATURE: 96.8 F

## 2024-04-05 DIAGNOSIS — Z01.20 ENCOUNTER FOR DENTAL EXAMINATION: Primary | ICD-10-CM

## 2024-04-05 PROCEDURE — D1110 PROPHYLAXIS - ADULT: HCPCS | Performed by: DENTAL HYGIENIST

## 2024-04-05 NOTE — DENTAL PROCEDURE DETAILS
Ed TREVOR Kruse Jr. presents for a Periodic exam. No periodic exam done today because pt had an issue with his gum on the lower right side from the LPD.  Verbal consent for treatment given in addition to the forms.   ---Pt 15 min late for appt  Reviewed health history - Patient is ASA II  Consents signed: Yes     Perio: Localized #LL and LR molars, Moderate bleeding, and Recession  ---Not enough time to probe today - do a FMP at next recall  Pain Scale: 0  Caries Assessment: Medium  Radiographs: None;  not enough time  - take vert Bws at next recall  EO/IO/OCS:  tissue red and irritated on LR due to denture?     Oral Hygiene instruction reviewed and given.  OHI:  Fair  ---Lt calc and plaque, lt stain lower ant linguals  ---Gingival inflammation on LL and LR molars  ---Handscaled, polish, floss  Recommended Hygiene recall visits with  Ed.     Treatment Plan:  1.  3mrc w/ FMP and vertical BWs  2.  Caries control: as charted  3.  Occlusal evaluation: Missing teeth - wears LPD    Prognosis is Good.  Referrals needed: No  Exam:  Dr. Anderson    NV1:  3mrc w/ FMP and vertical Bws - 60 min

## 2024-06-03 ENCOUNTER — OFFICE VISIT (OUTPATIENT)
Dept: DENTISTRY | Facility: CLINIC | Age: 59
End: 2024-06-03

## 2024-06-03 VITALS — SYSTOLIC BLOOD PRESSURE: 135 MMHG | TEMPERATURE: 99.7 F | HEART RATE: 79 BPM | DIASTOLIC BLOOD PRESSURE: 80 MMHG

## 2024-06-03 DIAGNOSIS — Z01.20 ENCOUNTER FOR DENTAL EXAMINATION: Primary | ICD-10-CM

## 2024-06-03 PROCEDURE — D0274 BITEWINGS - 4 RADIOGRAPHIC IMAGES: HCPCS

## 2024-06-03 PROCEDURE — D0120 PERIODIC ORAL EVALUATION - ESTABLISHED PATIENT: HCPCS

## 2024-06-03 NOTE — DENTAL PROCEDURE DETAILS
Ed TREVOR Kruse Jr. presents for a Periodic exam. Verbal consent for treatment given in addition to the forms.     Reviewed health history - Patient is ASA II  Consents signed: Yes     Perio: No findings  Pain Scale: 0  Caries Assessment: Medium  Radiographs: Bitewings x4     Oral Hygiene instruction reviewed and given.  Recommended Hygiene recall visits with the Ed.     Treatment Plan:  1.  Infection control: NA   2.  Periodontal therapy: adult prophy  3.  Caries control: as charted    #8 Crown not recommended due to being aggressive treatment with unclear gain, patient understood.     Referrals needed: No  Next Visit:  #1 Occ

## 2024-06-21 ENCOUNTER — OFFICE VISIT (OUTPATIENT)
Dept: DENTISTRY | Facility: CLINIC | Age: 59
End: 2024-06-21

## 2024-06-21 VITALS — HEART RATE: 77 BPM | TEMPERATURE: 98 F | SYSTOLIC BLOOD PRESSURE: 137 MMHG | DIASTOLIC BLOOD PRESSURE: 92 MMHG

## 2024-06-21 DIAGNOSIS — K02.9 DENTAL CARIES: Primary | ICD-10-CM

## 2024-06-21 PROCEDURE — D2391 RESIN-BASED COMPOSITE - 1 SURFACE, POSTERIOR: HCPCS

## 2024-06-21 NOTE — DENTAL PROCEDURE DETAILS
Composite Filling    Clyde Kruse Jr. presents for composite filling. PMH reviewed, no changes.    Applied topical benzocaine, administered 1 carp 4% articaine 1:100k epi via buccal infiltration    Prepped tooth #1 O with 330 carbide and 8835 lio on high speed. Caries removed with round carbide on slow speed.  Isolation with dri-angles    Etch with 37% H2PO4, rinse, dry. Applied Adhese with 20 second scrub once, gentle air dry and light cured for 10s. Restored with Tetric bulk paolo shade A2 and light cured.    Refined with finishing burs, polished with enhance point. Verified occlusion and contacts. Pt left satisfied.    NV: #12 B, 13 B; adjust upper partial if time permits

## 2024-07-17 ENCOUNTER — OFFICE VISIT (OUTPATIENT)
Dept: DENTISTRY | Facility: CLINIC | Age: 59
End: 2024-07-17

## 2024-07-17 DIAGNOSIS — Z01.20 ENCOUNTER FOR DENTAL EXAMINATION: Primary | ICD-10-CM

## 2024-07-17 DIAGNOSIS — K03.6 ACCRETIONS ON TEETH: ICD-10-CM

## 2024-07-17 PROCEDURE — D1110 PROPHYLAXIS - ADULT: HCPCS

## 2024-07-17 NOTE — DENTAL PROCEDURE DETAILS
ASA 2  CC none  patient presents for 3 mos recall per his request    Note  patient originally upset that he was not being seen by his regular provider/ hygienist   Stated it is in his record that he prefers to be scheduled with same providers   I explained that she was out sick and offered to have him reschedule if he was not happy with seeing me today   Patient agreed to stay for [facility].  Patient requested that he keep his ear buds in to listen to music as he does not like sound of scaling...       Prophylaxis completed with  hand instrumentation.  Soft plaque removed and  light supragingival calculus removed   reviewed probe chart and noted changes/ recession    Healthy dentition   slight bleeding with scaling lower R/L molars   Polished with prophy cup and paste.  Flossed and provided Oral Health Instructions.  Demonstrated proper brushing and flossing technique.  Patient left satisfied and ambulatory.  All questions answered    NV  schedule later this week   abfractions UL as charted  DR CRYSTAL    He is aware of excessive wear/ abfractions   NG has been recommended     3 mos recall exam   BWX if due

## 2024-07-19 ENCOUNTER — OFFICE VISIT (OUTPATIENT)
Dept: DENTISTRY | Facility: CLINIC | Age: 59
End: 2024-07-19

## 2024-07-19 VITALS — TEMPERATURE: 97.8 F | HEART RATE: 68 BPM | SYSTOLIC BLOOD PRESSURE: 143 MMHG | DIASTOLIC BLOOD PRESSURE: 86 MMHG

## 2024-07-19 DIAGNOSIS — K03.2 ABFRACTION: Primary | ICD-10-CM

## 2024-07-19 PROCEDURE — D2391 RESIN-BASED COMPOSITE - 1 SURFACE, POSTERIOR: HCPCS

## 2024-07-29 NOTE — PROGRESS NOTES
Composite Filling    Clyde Kruse Jr. presents for composite filling. PMH reviewed, no changes.    Discussed with patient need for RCT if pulp exposure occurs or in future if pulp is inflamed. Pt understands and consents.    No local anesthesia required, patient comfortable throughout procedure.    Cleaned tooth #12, 13 B abfraction lesion with prophy brush and pumice, rinsed, dried. Isolation with cotton rolls and dri-angles    Scrubbed cavity conditioner into prep for 20 seconds, rinsed, dried. Restored with Equia Forte shade A2 and allowed to cure for 2.5 minutes.    Refined with finishing burs, applied Equia Forte coat and light cured. Verified occlusion and contacts. Pt left satisfied.    NV: #6 I and adj partial     Recall due 10/18 (3 MRC)

## 2024-08-30 ENCOUNTER — OFFICE VISIT (OUTPATIENT)
Dept: DENTISTRY | Facility: CLINIC | Age: 59
End: 2024-08-30

## 2024-08-30 VITALS — SYSTOLIC BLOOD PRESSURE: 147 MMHG | TEMPERATURE: 97.1 F | HEART RATE: 73 BPM | DIASTOLIC BLOOD PRESSURE: 94 MMHG

## 2024-08-30 DIAGNOSIS — Z01.20 ENCOUNTER FOR DENTAL EXAMINATION: Primary | ICD-10-CM

## 2024-08-30 PROCEDURE — WIS5009 POST-OP DENTURE ADJUSTMENT

## 2024-08-30 PROCEDURE — D2330 RESIN-BASED COMPOSITE - 1 SURFACE, ANTERIOR: HCPCS

## 2024-09-02 NOTE — PROGRESS NOTES
Composite Filling and Flexible partial adjustment     Clyde Kruse Jr. presents for composite filling. PMH reviewed, no changes.    No anesthesia needed, patient comfortable throughout procedure.    Cupped attrition lesion #6 I cleaned with prophy brush and pumice. Isolation with cotton rolls     Etch with 37% H2PO4, rinse, dry. Applied Adhese with 20 second scrub once, gentle air dry and light cured for 10s. Restored with Tetric bulk paolo shade A2 and light cured.    Refined with finishing burs, polished with enhance point. Verified occlusion and contacts.     Patient brought upper flexible partial in as well to see if adjustment could improve fit. Patient states he had not been wearing upper partial for some time. Using Occlude, PIP paste and flexible partial adjustment kit and straight hand piece, areas of premature contact and irritation adjusted. Seating improved but not fully achieved, patient states that the patient feels better and he would like to wear it to let gums and teeth adjust to its fit.    Pt left satisfied.    NV: recall / partial adjustment as needed.

## 2024-10-18 ENCOUNTER — TELEPHONE (OUTPATIENT)
Dept: DENTISTRY | Facility: CLINIC | Age: 59
End: 2024-10-18

## 2024-10-18 NOTE — TELEPHONE ENCOUNTER
Called PT # listed on his chart to confirm appointment on 10/24/24 @ 2:40, was not able to reach for a conformation.   Called 10/18/24 @ 9:51am

## 2024-10-21 ENCOUNTER — TELEPHONE (OUTPATIENT)
Dept: DENTISTRY | Facility: CLINIC | Age: 59
End: 2024-10-21

## 2024-10-21 ENCOUNTER — OFFICE VISIT (OUTPATIENT)
Dept: DENTISTRY | Facility: CLINIC | Age: 59
End: 2024-10-21

## 2024-10-21 VITALS — HEART RATE: 81 BPM | SYSTOLIC BLOOD PRESSURE: 124 MMHG | DIASTOLIC BLOOD PRESSURE: 89 MMHG | TEMPERATURE: 98.1 F

## 2024-10-21 DIAGNOSIS — K05.6 PERIODONTAL DISEASE: Primary | ICD-10-CM

## 2024-10-21 PROCEDURE — D1110 PROPHYLAXIS - ADULT: HCPCS | Performed by: DENTAL HYGIENIST

## 2024-10-21 NOTE — TELEPHONE ENCOUNTER
Patient called requesting to speak to a  or Financial counselor about a bill that is outstanding. I asked the patient which location do they normal see so I can transfer them to the correct koby. Patient stated he did not want to speak to anyone in the [place] location. He wanted to speak to someone that will help him break down his outstanding bill. I tried to let him know best person that could help him would be the  at the location he is being seen. since each location has  that know our patients cases and can go in details. Patient started getting upset because he didn't want to be transferred to the [facility]. I also stated who can help also since he doesn't want to speak to [facility] is our financial counselor since they also see account balances. Gave him the number and before he hung up asked patient if he would like me to reach out personally to the  in [place] since they are still the best person to reach if financial counselors aren't available. Patient stated no, he is going to get in touch with our counselors and than if he can't he'll reach out to [facility]. He also stated he did not want me to make the [facility] aware he was calling another location for financial assistance.

## 2024-10-21 NOTE — DENTAL PROCEDURE DETAILS
ASA  II  Pain - 0  Rev/ M/DH    Prophylaxis completed with ultrasonic  and hand instrumentation.    ---Lt plaque and calc.  ---Soft plaque removed and sub /supragingival calculus removed from all teeth.    ---Polished with prophy cup and paste.    ---Flossed and provided Oral Health Instructions.    ---Demonstrated proper brushing and flossing technique.    ---Patient left satisfied and ambulatory.    Exam:  none  Referral:  none  TX plan:  Pt asked if Dr. Anderson could look at lower incisor to see if tx needed to be done.  Added 22, 23, 27 incisals to tx plan    NV1:  Rest 22, 23, 27 - I - 60 min  NV2:  EX, Pro - 3 month - 50 min - 3 months from 10/21/24

## 2024-11-01 ENCOUNTER — TELEPHONE (OUTPATIENT)
Dept: DENTISTRY | Facility: CLINIC | Age: 59
End: 2024-11-01

## 2024-11-01 NOTE — TELEPHONE ENCOUNTER
Pt called made payment of $124.80 over the phone. Payment processed and collected, mailed payment receipt to pt.

## 2024-11-01 NOTE — TELEPHONE ENCOUNTER
Pt called made payment over the phone $124.80 payment processed and collected, will mail out payment receipt.

## 2024-11-18 ENCOUNTER — OFFICE VISIT (OUTPATIENT)
Dept: DENTISTRY | Facility: CLINIC | Age: 59
End: 2024-11-18

## 2024-11-18 VITALS — SYSTOLIC BLOOD PRESSURE: 148 MMHG | DIASTOLIC BLOOD PRESSURE: 81 MMHG

## 2024-11-18 DIAGNOSIS — Z01.20 ENCOUNTER FOR DENTAL EXAMINATION: Primary | ICD-10-CM

## 2024-11-18 PROCEDURE — D2330 RESIN-BASED COMPOSITE - 1 SURFACE, ANTERIOR: HCPCS

## 2024-11-23 NOTE — PROGRESS NOTES
Procedure Details  25 I  - RESIN-BASED COMPOSITE - 1 SURFACE, ANTERIOR  26 I  - RESIN-BASED COMPOSITE - 1 SURFACE, ANTERIOR  27 I  - RESIN-BASED COMPOSITE - 1 SURFACE, ANTERIOR  Composite Filling    Clyde Kruse Jr. presents for composite filling. PMH reviewed, no changes.    No anesthesia needed, patient reports being comfortable throughout procedure.     Pumiced tooth #25 I, 26 I, 27 I with prophy brush on slow speed.Isolation with cotton rolls.    Etch with 37% H2PO4, rinse, dry. Applied Adhese with 20 second scrub once, gentle air dry and light cured for 10s. Restored with Tetric bulk paolo shade A2 and light cured.    Refined with finishing burs, polished with enhance point. Verified occlusion and contacts. Pt left satisfied.    NV: #22 I, 24 I

## 2024-11-23 NOTE — DENTAL PROCEDURE DETAILS
Composite Filling    Clyde Kruse Jr. presents for composite filling. PMH reviewed, no changes.    No anesthesia needed, patient reports being comfortable throughout procedure.     Pumiced tooth #25 I, 26 I, 27 I with prophy brush on slow speed.Isolation with cotton rolls.    Etch with 37% H2PO4, rinse, dry. Applied Adhese with 20 second scrub once, gentle air dry and light cured for 10s. Restored with Tetric bulk paolo shade A2 and light cured.    Refined with finishing burs, polished with enhance point. Verified occlusion and contacts. Pt left satisfied.    NV: #22 I, 24 I

## 2024-12-23 ENCOUNTER — OFFICE VISIT (OUTPATIENT)
Dept: DENTISTRY | Facility: CLINIC | Age: 59
End: 2024-12-23

## 2024-12-23 DIAGNOSIS — Z01.20 ENCOUNTER FOR DENTAL EXAMINATION: Primary | ICD-10-CM

## 2024-12-23 PROCEDURE — D2330 RESIN-BASED COMPOSITE - 1 SURFACE, ANTERIOR: HCPCS

## 2024-12-29 NOTE — DENTAL PROCEDURE DETAILS
Composite Filling    Clyde Kruse Jr. presents for composite filling. PMH reviewed, no changes.    No anesthesia needed, patient comfortable throughout procedure    Prepped tooth #22, 24 I with prophy brush and pumice.  Isolation with cotton rolls   Etch with 37% H2PO4, rinse, dry. Applied Adhese with 20 second scrub once, gentle air dry and light cured for 10s. Restored with Tetric bulk paolo shade A2 and light cured.    Refined with finishing burs, polished with enhance point. Verified occlusion and contacts. Pt left satisfied.    NV: recall

## 2024-12-29 NOTE — PROGRESS NOTES
Procedure Details  22 I  - RESIN-BASED COMPOSITE - 1 SURFACE, ANTERIOR  24 I  - RESIN-BASED COMPOSITE - 1 SURFACE, ANTERIOR  Composite Filling    Clyde Kruse Jr. presents for composite filling. PMH reviewed, no changes.    No anesthesia needed, patient comfortable throughout procedure    Prepped tooth #22, 24 I with prophy brush and pumice.  Isolation with cotton rolls   Etch with 37% H2PO4, rinse, dry. Applied Adhese with 20 second scrub once, gentle air dry and light cured for 10s. Restored with Tetric bulk paolo shade A2 and light cured.    Refined with finishing burs, polished with enhance point. Verified occlusion and contacts. Pt left satisfied.    NV: recall

## 2025-05-20 ENCOUNTER — OFFICE VISIT (OUTPATIENT)
Dept: DENTISTRY | Facility: CLINIC | Age: 60
End: 2025-05-20

## 2025-05-20 VITALS — SYSTOLIC BLOOD PRESSURE: 125 MMHG | TEMPERATURE: 98.6 F | HEART RATE: 82 BPM | DIASTOLIC BLOOD PRESSURE: 75 MMHG

## 2025-05-20 DIAGNOSIS — Z01.20 ENCOUNTER FOR DENTAL EXAMINATION: Primary | ICD-10-CM

## 2025-05-20 PROCEDURE — D0120 PERIODIC ORAL EVALUATION - ESTABLISHED PATIENT: HCPCS

## 2025-05-20 PROCEDURE — D1110 PROPHYLAXIS - ADULT: HCPCS | Performed by: DENTAL HYGIENIST

## 2025-05-20 PROCEDURE — D1330 ORAL HYGIENE INSTRUCTIONS: HCPCS | Performed by: DENTAL HYGIENIST

## 2025-05-20 NOTE — PROGRESS NOTES
PERIODIC EXAM, ADULT PROPHY , (no xrays due )   REVIEWED MED HX: meds, allergies, health changes reviewed in [facility]. All consents signed.  CHIEF CONCERN: LPD bothering him - rubbing on LL*  ---Dr Anderson adjusted LPD today - pt is to let us know if area is still bothering him.    PAIN SCALE:  0  ASA CLASS:  II  PLAQUE:  moderate  CALCULUS:  light to mod  BLEEDING:   lt to moderate  STAIN :   none      PERIO:  Mild to moderate bone loss and recession    Hygiene Procedures:  Scaled, Polished, Flossed    Oral Hygiene Instruction: Brushing Minimum 2x daily for 2 minutes, daily flossing, Listerine, and Recommended soft toothbrush only    Dispensed: Toothbrush, Toothpaste, Floss    Visual and Tactile Intraoral/ Extraoral evaluation: Oral and Oropharyngeal cancer evaluation. No findings     Dr. Anderson   Reviewed with patient clinical and radiographic findings and patient verbalized understanding. All questions and concerns addressed.     REFERRALS: none    CARIES FINDINGS: see tooth chart       TREATMENT  PLAN :   NV1:  Rest 2 - O and smooth #8 lingual - 60 min  NV2:  3mrc prophy only - 50 min    Last BWX:  6/3/24  Last Panorex:  8/13/20  Last FMX :  2/11/19

## 2025-05-22 ENCOUNTER — OFFICE VISIT (OUTPATIENT)
Dept: DENTISTRY | Facility: CLINIC | Age: 60
End: 2025-05-22

## 2025-05-22 VITALS — SYSTOLIC BLOOD PRESSURE: 150 MMHG | HEART RATE: 83 BPM | DIASTOLIC BLOOD PRESSURE: 87 MMHG

## 2025-05-22 DIAGNOSIS — K08.50 DEFECTIVE DENTAL RESTORATION: Primary | ICD-10-CM

## 2025-05-22 PROCEDURE — D2391 RESIN-BASED COMPOSITE - 1 SURFACE, POSTERIOR: HCPCS

## 2025-05-22 NOTE — PROGRESS NOTES
Procedure Details  2 O  - RESIN-BASED COMPOSITE - 1 SURFACE, POSTERIOR  Composite Filling    Ed TREVOR Kruse Jr. presents for composite filling. PMH reviewed, no changes.    Discussed with patient need for RCT if pulp exposure occurs or in future if pulp is inflamed. Pt understands and consents.    Applied topical benzocaine, administered 1 carps 4% articaine 1:100k epi via buccal infiltration     Prepped tooth #2 O with 330 carbide on high speed. Caries removed with round carbide on slow speed.  Isolation with cotton rolls and dri-angles    Etch with 37% H2PO4, rinse, dry. Applied Adhese with 20 second scrub once, gentle air dry and light cured for 10s. Restored with Tetric bulk paolo shade A2 and light cured.    Both #2 O, 8 L refined with finishing burs, polished with enhance point. Verified occlusion and contacts. Pt left satisfied.    NV: recall

## 2025-05-22 NOTE — DENTAL PROCEDURE DETAILS
Composite Filling    Clyde Kruse Jr. presents for composite filling. PMH reviewed, no changes.    Discussed with patient need for RCT if pulp exposure occurs or in future if pulp is inflamed. Pt understands and consents.    Applied topical benzocaine, administered 1 carps 4% articaine 1:100k epi via buccal infiltration     Prepped tooth #2 O with 330 carbide on high speed. Caries removed with round carbide on slow speed.  Isolation with cotton rolls and dri-angles    Etch with 37% H2PO4, rinse, dry. Applied Adhese with 20 second scrub once, gentle air dry and light cured for 10s. Restored with Tetric bulk paolo shade A2 and light cured.    Both #2 O, 8 L refined with finishing burs, polished with enhance point. Verified occlusion and contacts. Pt left satisfied.    NV: recall

## 2025-05-31 ENCOUNTER — HOSPITAL ENCOUNTER (EMERGENCY)
Facility: HOSPITAL | Age: 60
Discharge: HOME | End: 2025-06-01
Attending: EMERGENCY MEDICINE | Admitting: EMERGENCY MEDICINE
Payer: MEDICARE

## 2025-05-31 VITALS
DIASTOLIC BLOOD PRESSURE: 93 MMHG | RESPIRATION RATE: 18 BRPM | HEART RATE: 83 BPM | TEMPERATURE: 98.3 F | SYSTOLIC BLOOD PRESSURE: 152 MMHG | OXYGEN SATURATION: 98 %

## 2025-05-31 DIAGNOSIS — R60.0 PERIPHERAL EDEMA: ICD-10-CM

## 2025-05-31 DIAGNOSIS — I89.0 LYMPHEDEMA: ICD-10-CM

## 2025-05-31 DIAGNOSIS — R10.32 LEFT LOWER QUADRANT ABDOMINAL PAIN: Primary | ICD-10-CM

## 2025-05-31 LAB
ALBUMIN SERPL BCG-MCNC: 4.4 G/DL (ref 3.5–5)
ALP SERPL-CCNC: 49 U/L (ref 34–104)
ALT SERPL W P-5'-P-CCNC: 12 U/L (ref 7–52)
ANION GAP SERPL CALCULATED.3IONS-SCNC: 5 MMOL/L (ref 4–13)
AST SERPL W P-5'-P-CCNC: 16 U/L (ref 13–39)
BASOPHILS # BLD AUTO: 0.01 THOUSANDS/ÂΜL (ref 0–0.1)
BASOPHILS NFR BLD AUTO: 0 % (ref 0–1)
BILIRUB SERPL-MCNC: 0.61 MG/DL (ref 0.2–1)
BILIRUB UR QL STRIP: NEGATIVE
BUN SERPL-MCNC: 11 MG/DL (ref 5–25)
CALCIUM SERPL-MCNC: 9.1 MG/DL (ref 8.4–10.2)
CHLORIDE SERPL-SCNC: 100 MMOL/L (ref 96–108)
CLARITY UR: CLEAR
CO2 SERPL-SCNC: 29 MMOL/L (ref 21–32)
COLOR UR: COLORLESS
CREAT SERPL-MCNC: 1.2 MG/DL (ref 0.6–1.3)
D DIMER PPP FEU-MCNC: <0.27 UG/ML FEU
EOSINOPHIL # BLD AUTO: 0.05 THOUSAND/ÂΜL (ref 0–0.61)
EOSINOPHIL NFR BLD AUTO: 2 % (ref 0–6)
ERYTHROCYTE [DISTWIDTH] IN BLOOD BY AUTOMATED COUNT: 14.3 % (ref 11.6–15.1)
GFR SERPL CREATININE-BSD FRML MDRD: 65 ML/MIN/1.73SQ M
GLUCOSE SERPL-MCNC: 98 MG/DL (ref 65–140)
GLUCOSE UR STRIP-MCNC: NEGATIVE MG/DL
HCT VFR BLD AUTO: 44.4 % (ref 36.5–49.3)
HGB BLD-MCNC: 15.2 G/DL (ref 12–17)
HGB UR QL STRIP.AUTO: NEGATIVE
IMM GRANULOCYTES # BLD AUTO: 0 THOUSAND/UL (ref 0–0.2)
IMM GRANULOCYTES NFR BLD AUTO: 0 % (ref 0–2)
KETONES UR STRIP-MCNC: NEGATIVE MG/DL
LEUKOCYTE ESTERASE UR QL STRIP: NEGATIVE
LIPASE SERPL-CCNC: 22 U/L (ref 11–82)
LYMPHOCYTES # BLD AUTO: 0.76 THOUSANDS/ÂΜL (ref 0.6–4.47)
LYMPHOCYTES NFR BLD AUTO: 32 % (ref 14–44)
MCH RBC QN AUTO: 26.6 PG (ref 26.8–34.3)
MCHC RBC AUTO-ENTMCNC: 34.2 G/DL (ref 31.4–37.4)
MCV RBC AUTO: 78 FL (ref 82–98)
MONOCYTES # BLD AUTO: 0.41 THOUSAND/ÂΜL (ref 0.17–1.22)
MONOCYTES NFR BLD AUTO: 17 % (ref 4–12)
NEUTROPHILS # BLD AUTO: 1.18 THOUSANDS/ÂΜL (ref 1.85–7.62)
NEUTS SEG NFR BLD AUTO: 49 % (ref 43–75)
NITRITE UR QL STRIP: NEGATIVE
NRBC BLD AUTO-RTO: 0 /100 WBCS
PH UR STRIP.AUTO: 7.5 [PH]
PLATELET # BLD AUTO: 233 THOUSANDS/UL (ref 149–390)
PMV BLD AUTO: 8.8 FL (ref 8.9–12.7)
POTASSIUM SERPL-SCNC: 4.5 MMOL/L (ref 3.5–5.3)
PROT SERPL-MCNC: 7.3 G/DL (ref 6.4–8.4)
PROT UR STRIP-MCNC: NEGATIVE MG/DL
RBC # BLD AUTO: 5.71 MILLION/UL (ref 3.88–5.62)
SODIUM SERPL-SCNC: 134 MMOL/L (ref 135–147)
SP GR UR STRIP.AUTO: 1.01 (ref 1–1.03)
UROBILINOGEN UR STRIP-ACNC: <2 MG/DL
WBC # BLD AUTO: 2.41 THOUSAND/UL (ref 4.31–10.16)

## 2025-05-31 PROCEDURE — 80053 COMPREHEN METABOLIC PANEL: CPT

## 2025-05-31 PROCEDURE — 36415 COLL VENOUS BLD VENIPUNCTURE: CPT

## 2025-05-31 PROCEDURE — 85025 COMPLETE CBC W/AUTO DIFF WBC: CPT

## 2025-05-31 PROCEDURE — 99285 EMERGENCY DEPT VISIT HI MDM: CPT

## 2025-05-31 PROCEDURE — 99284 EMERGENCY DEPT VISIT MOD MDM: CPT

## 2025-05-31 PROCEDURE — 85379 FIBRIN DEGRADATION QUANT: CPT

## 2025-05-31 PROCEDURE — 83690 ASSAY OF LIPASE: CPT

## 2025-05-31 PROCEDURE — 81003 URINALYSIS AUTO W/O SCOPE: CPT

## 2025-06-01 ENCOUNTER — APPOINTMENT (EMERGENCY)
Dept: CT IMAGING | Facility: HOSPITAL | Age: 60
End: 2025-06-01
Payer: MEDICARE

## 2025-06-01 ENCOUNTER — TELEPHONE (OUTPATIENT)
Dept: OTHER | Facility: OTHER | Age: 60
End: 2025-06-01

## 2025-06-01 PROCEDURE — 74177 CT ABD & PELVIS W/CONTRAST: CPT

## 2025-06-01 RX ORDER — DICYCLOMINE HCL 20 MG
20 TABLET ORAL 2 TIMES DAILY
Qty: 20 TABLET | Refills: 0 | Status: SHIPPED | OUTPATIENT
Start: 2025-06-01

## 2025-06-01 RX ADMIN — IOHEXOL 100 ML: 350 INJECTION, SOLUTION INTRAVENOUS at 00:58

## 2025-06-01 NOTE — DISCHARGE INSTRUCTIONS
Today I provided prescription for Bentyl.  Bentyl is a medication that you can try for relief of the recent abdominal pain.  Bentyl is a medication that reduces spasming of the intestines.   The CT scan that was obtained tonight returned without any acute findings.  This is reassuring in terms of ruling out significant ailments of the abdomen.  Continued monitoring in the coming days to weeks is the next step.  Follow-up with your primary care doctor to discuss symptoms and whether further specialist referral would be beneficial.  The CMP obtained today did show a slight change in the GFR which is a measure of the flow rate through your kidneys.  This can be improved by making sure to drink enough water throughout the day.

## 2025-06-01 NOTE — ED PROVIDER NOTES
Time reflects when diagnosis was documented in both MDM as applicable and the Disposition within this note       Time User Action Codes Description Comment    6/1/2025  2:00 AM Karel Wyatt Add [R10.32] Left lower quadrant abdominal pain     6/1/2025  2:01 AM Karel Wyatt Add [R60.0] Peripheral edema     6/1/2025  2:01 AM Karel Wyatt Add [I89.0] Lymphedema           ED Disposition       ED Disposition   Discharge    Condition   Stable    Date/Time   Sun Jun 1, 2025  2:00 AM    Comment   Ed TREVOR Kruse Jr. discharge to home/self care.                   Assessment & Plan       Medical Decision Making  59-year-old male presents to ED for evaluation of abdominal pain, left leg swelling as seen in HPI.  On physical examination patient vital signs stable.  Normotensive.  Afebrile.  Nontachycardic.  Nonhypoxic.  Alert and responding to questions appropriately.  No murmur.  Normal breath sounds.  Tender to palpation of left lower quadrant of abdomen.  No overlying skin changes of abdomen.  Has bilateral lower extremity edema.  Left leg is slightly worse than right leg.  Compartments of calf, thigh soft and compressible.  Patient is anticoagulated with Eliquis which he takes as prescribed.  Plan to obtain workup consisting of CBC, CMP, lipase, D-dimer, UA, CT abdomen pelvis IV contrast.  Differential diagnosis includes but not limited to constipation, viral GI infection, diverticulitis, bowel obstruction, IBS, IBD, urinary stone, UTI, MICHAEL, electrolyte abnormality, lymphedema, DVT, peripheral vascular disease, vascular insufficiency    CBC with similar values to previous measures.  Continued leukopenia which is normal for patient.  He has seen heme-onc for this.  CMP without concerning values.  Does have a mild change in GFR from previous measures.  Likely due to reduced p.o. intake, diarrhea recently.  Discussed this change in GFR with patient and advised oral hydration, monitoring.  Lipase WNL.  D-dimer WNL.  UA WNL.   "CT abdomen pelvis with IV contrast returned without acute abnormality.    Discussed results of workup with patient.  No specific etiology for symptoms found however large number of life-threatening ailments were ruled out with workup.  Possibility of patient having the abdominal pain due to intestinal spasming from recent constipation, subsequent diarrhea after laxative use.  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  Patient can be safely discharged at this time.  Encouraged oral hydration, advance diet as tolerated.  I did provide a prescription for Bentyl to use if you would like.  It may help with abdominal spasming in the coming days.  Advised to check in with his PCP in the coming days to ensure resolution of symptoms.  Return precautions discussed.    Prior to discharge, discharge instructions were discussed with patient at bedside. Patient was provided both verbal and written instructions. Patient is understanding of the discharge instructions and is agreeable to plan of care. Return precautions were discussed with patient bedside, patient verbalized understanding of signs and symptoms that would necessitate return to the ED. All questions were answered. Patient was comfortable with the plan of care and discharged to home.    Portions of this chart may have been written with voice recognition software.  Occasional grammatical errors, wrong word or \"sound a like\" substitutions may have occurred due to software limitations.  Please read carefully and use context to recognize where substitutions have occurred.     Amount and/or Complexity of Data Reviewed  Labs: ordered.  Radiology: ordered.    Risk  Prescription drug management.             Medications   iohexol (OMNIPAQUE) 350 MG/ML injection (MULTI-DOSE) 100 mL (100 mL Intravenous Given 6/1/25 0058)       ED Risk Strat Scores                    No data " recorded        SBIRT 22yo+      Flowsheet Row Most Recent Value   Initial Alcohol Screen: US AUDIT-C     1. How often do you have a drink containing alcohol? 0 Filed at: 05/31/2025 2153   2. How many drinks containing alcohol do you have on a typical day you are drinking?  0 Filed at: 05/31/2025 2153   3a. Male UNDER 65: How often do you have five or more drinks on one occasion? 0 Filed at: 05/31/2025 2153   3b. FEMALE Any Age, or MALE 65+: How often do you have 4 or more drinks on one occassion? 0 Filed at: 05/31/2025 2153   Audit-C Score 0 Filed at: 05/31/2025 2153   MAXIMO: How many times in the past year have you...    Used an illegal drug or used a prescription medication for non-medical reasons? Never Filed at: 05/31/2025 2153                            History of Present Illness       Chief Complaint   Patient presents with    Abdominal Pain     Pt having generalized abdominal pain for 2 days w/ NVD. Pt notes increase in urination.     Leg Swelling     Pt c/o left leg swelling and is requesting a D-dimer       Past Medical History[1]   Past Surgical History[2]   Family History[3]   Social History[4]   E-Cigarette/Vaping    E-Cigarette Use Never User       E-Cigarette/Vaping Substances    Nicotine No     THC No     CBD No     Flavoring No     Other No     Unknown No       I have reviewed and agree with the history as documented.     59-year-old male with history of hypertension, congenital leukopenia, KAREN presents to ED for evaluation of abdominal pain, left leg swelling.  Patient states that the past 2-3 days he has been experiencing persistent left lower quadrant, left flank abdominal pain.  Has had nausea without vomiting.  He did have a runny bowel movement today after taking a laxative for what he felt was constipation.  Has had 2 bowel movements today.  The first one in the morning was normal-appearing.  The second bowel movement was more loose.  Denies fever, chills, shortness of breath, chest pain,  dysuria, hematuria.     Patient's second concern is left leg swelling.  Patient states that recently the left leg has been more swollen than usual.  Chronically has lymphedema.  Usually the leg swelling improves after elevation while sleeping.  Recently the left leg has not had improved swelling with elevation.  Previously has had a pulmonary embolism.  He takes Eliquis daily.  Patient would like to have a D-dimer obtained today to risk stratify for whether or not a DVT is present.    Review of patient's chart demonstrates that on October 9, 2024 he visited [facility] with left flank pain.  Had reassuring workup at that time.     Patient was seen by [facility] vascular surgery on May 7, 2025.  Was advised to continue wearing compression stockings, elevate legs throughout the day.  He was given referral to lymphedema care team.              Review of Systems   Cardiovascular:  Positive for leg swelling.   Gastrointestinal:  Positive for abdominal pain and nausea.   All other systems reviewed and are negative.          Objective       ED Triage Vitals [05/31/25 2151]   Temperature Pulse Blood Pressure Respirations SpO2 Patient Position - Orthostatic VS   98.3 °F (36.8 °C) 83 152/93 18 98 % Sitting      Temp Source Heart Rate Source BP Location FiO2 (%) Pain Score    Oral Monitor Right arm -- 8      Vitals      Date and Time Temp Pulse SpO2 Resp BP Pain Score FACES Pain Rating User   05/31/25 2151 98.3 °F (36.8 °C) 83 98 % 18 152/93 8 -- RK            Physical Exam  Vitals and nursing note reviewed.   Constitutional:       General: He is not in acute distress.     Appearance: He is well-developed. He is not ill-appearing, toxic-appearing or diaphoretic.   HENT:      Head: Normocephalic and atraumatic.     Eyes:      Conjunctiva/sclera: Conjunctivae normal.       Cardiovascular:      Rate and Rhythm: Normal rate and regular rhythm.      Heart sounds: Normal heart sounds. No murmur heard.     No friction rub. No gallop.    Pulmonary:      Effort: Pulmonary effort is normal. No respiratory distress.      Breath sounds: Normal breath sounds. No stridor. No wheezing, rhonchi or rales.   Abdominal:      Palpations: Abdomen is soft. There is no shifting dullness, fluid wave, mass or pulsatile mass.      Tenderness: There is abdominal tenderness in the left lower quadrant. There is no guarding or rebound. Negative signs include Lazo's sign, Rovsing's sign and McBurney's sign.      Hernia: No hernia is present.     Musculoskeletal:         General: No swelling.      Cervical back: Neck supple.      Right lower leg: Edema present.      Left lower leg: Edema present.     Skin:     General: Skin is warm and dry.      Capillary Refill: Capillary refill takes less than 2 seconds.     Neurological:      Mental Status: He is alert.     Psychiatric:         Mood and Affect: Mood normal.         Results Reviewed       Procedure Component Value Units Date/Time    D-Dimer [[id number]]  (Normal) Collected: 05/31/25 2239    Lab Status: Final result Specimen: Blood from Arm, Right Updated: 05/31/25 2328     D-Dimer, Quant <0.27 ug/ml FEU     Narrative:      In the evaluation for possible pulmonary embolism, in the appropriate (Well's Score of 4 or less) patient, the age adjusted d-dimer cutoff for this patient can be calculated as:    Age x 0.01 (in ug/mL) for Age-adjusted D-dimer exclusion threshold for a patient over 50 years.    UA (URINE) with reflex to Scope [[id number]] Collected: 05/31/25 2309    Lab Status: Final result Specimen: Urine, Clean Catch Updated: 05/31/25 2318     Color, UA Colorless     Clarity, UA Clear     Specific Gravity, UA 1.009     pH, UA 7.5     Leukocytes, UA Negative     Nitrite, UA Negative     Protein, UA Negative mg/dl      Glucose, UA Negative mg/dl      Ketones, UA Negative mg/dl      Urobilinogen, UA <2.0 mg/dl      Bilirubin, UA Negative     Occult Blood, UA Negative    Comprehensive metabolic panel [[id number]]   (Abnormal) Collected: 05/31/25 2239    Lab Status: Final result Specimen: Blood from Arm, Right Updated: 05/31/25 2317     Sodium 134 mmol/L      Potassium 4.5 mmol/L      Chloride 100 mmol/L      CO2 29 mmol/L      ANION GAP 5 mmol/L      BUN 11 mg/dL      Creatinine 1.20 mg/dL      Glucose 98 mg/dL      Calcium 9.1 mg/dL      AST 16 U/L      ALT 12 U/L      Alkaline Phosphatase 49 U/L      Total Protein 7.3 g/dL      Albumin 4.4 g/dL      Total Bilirubin 0.61 mg/dL      eGFR 65 ml/min/1.73sq m     Narrative:      National Kidney Disease Foundation guidelines for Chronic Kidney Disease (CKD):     Stage 1 with normal or high GFR (GFR > 90 mL/min/1.73 square meters)    Stage 2 Mild CKD (GFR = 60-89 mL/min/1.73 square meters)    Stage 3A Moderate CKD (GFR = 45-59 mL/min/1.73 square meters)    Stage 3B Moderate CKD (GFR = 30-44 mL/min/1.73 square meters)    Stage 4 Severe CKD (GFR = 15-29 mL/min/1.73 square meters)    Stage 5 End Stage CKD (GFR <15 mL/min/1.73 square meters)  Note: GFR calculation is accurate only with a steady state creatinine    Lipase [[id number]]  (Normal) Collected: 05/31/25 2239    Lab Status: Final result Specimen: Blood from Arm, Right Updated: 05/31/25 2317     Lipase 22 u/L     CBC and differential [[id number]]  (Abnormal) Collected: 05/31/25 2239    Lab Status: Final result Specimen: Blood from Arm, Right Updated: 05/31/25 2259     WBC 2.41 Thousand/uL      RBC 5.71 Million/uL      Hemoglobin 15.2 g/dL      Hematocrit 44.4 %      MCV 78 fL      MCH 26.6 pg      MCHC 34.2 g/dL      RDW 14.3 %      MPV 8.8 fL      Platelets 233 Thousands/uL      nRBC 0 /100 WBCs      Segmented % 49 %      Immature Grans % 0 %      Lymphocytes % 32 %      Monocytes % 17 %      Eosinophils Relative 2 %      Basophils Relative 0 %      Absolute Neutrophils 1.18 Thousands/µL      Absolute Immature Grans 0.00 Thousand/uL      Absolute Lymphocytes 0.76 Thousands/µL      Absolute Monocytes 0.41 Thousand/µL       Eosinophils Absolute 0.05 Thousand/µL      Basophils Absolute 0.01 Thousands/µL             CT abdomen pelvis with contrast   Final Interpretation by Bishnu Connors DO (06/01 0154)      No CT evidence of acute findings.         Workstation performed: [id number]             Procedures    ED Medication and Procedure Management   Prior to Admission Medications   Prescriptions Last Dose Informant Patient Reported? Taking?   Eliquis 5 MG   Yes No   Ventolin  (90 Base) MCG/ACT inhaler   Yes No   Sig: TAKE 2 PUFFS BY MOUTH EVERY 4 HOURS AS NEEDED FOR WHEEZE   amLODIPine (NORVASC) 5 mg tablet   Yes No   Sig: Take 5 mg by mouth daily   amoxicillin (AMOXIL) 500 mg capsule   Yes No   Sig: TAKE 1 CAPSULE BY MOUTH THREE TIMES A DAY FOR 10 DAYS   Patient not taking: Reported on 11/2/2022   azithromycin (ZITHROMAX) 250 mg tablet   Yes No   Sig: TAKE 2 TABLETS BY MOUTH TODAY, THEN TAKE 1 TABLET DAILY FOR 4 DAYS   Patient not taking: Reported on 6/29/2023   butalbital-acetaminophen-caffeine (FIORICET,ESGIC) -40 mg per tablet   Yes No   Sig: TAKE 1 TABLET BY MOUTH EVERY 4 HOURS AS NEEDED FOR HEADACHES.   famotidine (PEPCID) 20 mg tablet   No No   Sig: Take 1 tablet (20 mg total) by mouth 2 (two) times a day for 10 days   hydrochlorothiazide (HYDRODIURIL) 25 mg tablet   Yes No   Sig: TAKE 1 TABLET (25 MG TOTAL) BY MOUTH DAILY.   Patient not taking: Reported on 8/12/2023   loratadine (CLARITIN) 10 mg tablet   Yes No   Sig: Take 10 mg by mouth daily   metFORMIN (GLUCOPHAGE-XR) 500 mg 24 hr tablet   Yes No   Patient not taking: Reported on 8/12/2023   methylPREDNISolone 4 MG tablet therapy pack   Yes No   Sig: TAKE 6 TABLETS ON DAY 1 AS DIRECTED ON PACKAGE AND DECREASE BY 1 TAB EACH DAY FOR A TOTAL OF 6 DAYS   Patient not taking: Reported on 6/29/2023   omeprazole (PriLOSEC) 40 MG capsule   Yes No   Sig: Take 40 mg by mouth daily   sucralfate (CARAFATE) 1 g tablet   No No   Sig: Take 1 tablet (1 g total) by mouth 4 (four)  times a day for 10 days   zolpidem (AMBIEN) 5 mg tablet   Yes No   Sig: Take 5 mg by mouth daily as needed      Facility-Administered Medications: None     Discharge Medication List as of 6/1/2025  2:04 AM        START taking these medications    Details   dicyclomine (BENTYL) 20 mg tablet Take 1 tablet (20 mg total) by mouth 2 (two) times a day, Starting Sun 6/1/2025, Normal           CONTINUE these medications which have NOT CHANGED    Details   amLODIPine (NORVASC) 5 mg tablet Take 5 mg by mouth daily, Starting u 10/13/2022, Historical Med      amoxicillin (AMOXIL) 500 mg capsule TAKE 1 CAPSULE BY MOUTH THREE TIMES A DAY FOR 10 DAYS, Historical Med      azithromycin (ZITHROMAX) 250 mg tablet TAKE 2 TABLETS BY MOUTH TODAY, THEN TAKE 1 TABLET DAILY FOR 4 DAYS, Historical Med      butalbital-acetaminophen-caffeine (FIORICET,ESGIC) -40 mg per tablet TAKE 1 TABLET BY MOUTH EVERY 4 HOURS AS NEEDED FOR HEADACHES., Historical Med      Eliquis 5 MG Starting Mon 10/31/2022, Historical Med      famotidine (PEPCID) 20 mg tablet Take 1 tablet (20 mg total) by mouth 2 (two) times a day for 10 days, Starting Sat 5/7/2022, Until Tue 5/17/2022, Print      hydrochlorothiazide (HYDRODIURIL) 25 mg tablet TAKE 1 TABLET (25 MG TOTAL) BY MOUTH DAILY., Historical Med      loratadine (CLARITIN) 10 mg tablet Take 10 mg by mouth daily, Starting Thu 3/31/2022, Until Fri 3/31/2023, Historical Med      metFORMIN (GLUCOPHAGE-XR) 500 mg 24 hr tablet Starting Tue 6/27/2023, Historical Med      methylPREDNISolone 4 MG tablet therapy pack TAKE 6 TABLETS ON DAY 1 AS DIRECTED ON PACKAGE AND DECREASE BY 1 TAB EACH DAY FOR A TOTAL OF 6 DAYS, Historical Med      omeprazole (PriLOSEC) 40 MG capsule Take 40 mg by mouth daily, Starting u 10/13/2022, Historical Med      sucralfate (CARAFATE) 1 g tablet Take 1 tablet (1 g total) by mouth 4 (four) times a day for 10 days, Starting Sat 5/7/2022, Until Tue 5/17/2022, Print      Ventolin  (90  Base) MCG/ACT inhaler TAKE 2 PUFFS BY MOUTH EVERY 4 HOURS AS NEEDED FOR WHEEZE, Historical Med      zolpidem (AMBIEN) 5 mg tablet Take 5 mg by mouth daily as needed, Starting Fri 7/15/2022, Historical Med           No discharge procedures on file.  ED SEPSIS DOCUMENTATION   Time reflects when diagnosis was documented in both MDM as applicable and the Disposition within this note       Time User Action Codes Description Comment    6/1/2025  2:00 AM Karel Wyatt [R10.32] Left lower quadrant abdominal pain     6/1/2025  2:01 AM Karel Wyatt [R60.0] Peripheral edema     6/1/2025  2:01 AM Karel Wyatt [I89.0] Lymphedema                      [1]   Past Medical History:  Diagnosis Date    Left ear hearing loss     Lymphedema     Pulmonary embolism (HCC)     Sarcoidosis    [2] No past surgical history on file.  [3] No family history on file.  [4]   Social History  Tobacco Use    Smoking status: Never    Smokeless tobacco: Never   Vaping Use    Vaping status: Never Used   Substance Use Topics    Alcohol use: Not Currently    Drug use: Never        Karel Wyatt PA-C  06/01/25 0704

## 2025-06-02 ENCOUNTER — TELEPHONE (OUTPATIENT)
Dept: DENTISTRY | Facility: CLINIC | Age: 60
End: 2025-06-02

## 2025-06-02 NOTE — TELEPHONE ENCOUNTER
"Patient called the after-hours service to leave a message to Dr. Anderson, \"My dentures broke and I need for them to be adjusted in the meantime. Until, we can send for new ones.\"    Please call patient.   "

## 2025-06-03 ENCOUNTER — OFFICE VISIT (OUTPATIENT)
Dept: DENTISTRY | Facility: CLINIC | Age: 60
End: 2025-06-03

## 2025-06-03 VITALS — SYSTOLIC BLOOD PRESSURE: 121 MMHG | HEART RATE: 75 BPM | DIASTOLIC BLOOD PRESSURE: 76 MMHG

## 2025-06-03 DIAGNOSIS — Z01.20 ENCOUNTER FOR DENTAL EXAMINATION: Primary | ICD-10-CM

## 2025-06-03 PROCEDURE — WIS5000 PRELIMINARY IMPRESSIONS

## 2025-06-05 ENCOUNTER — TELEPHONE (OUTPATIENT)
Dept: DENTISTRY | Facility: CLINIC | Age: 60
End: 2025-06-05

## 2025-06-05 NOTE — TELEPHONE ENCOUNTER
pt is aware of cost. Stated he will call [facility] when he figures out his finances. tx plan est scanned in MM. Telephone encounter documented.

## 2025-06-23 NOTE — DENTAL PROCEDURE DETAILS
Dental procedures in this visit    [id number] - PRELIMINARY IMPRESSIONS (Completed)     Service provider: Nicholas Anderson DMD     Billing provider: Nicholas Anderson DMD     Subjective   Patient ID: Clyde Kruse Jr. is a 59 y.o. male.  No chief complaint on file.    HPI  The following portions of the chart were reviewed this encounter and updated as appropriate:    Tobacco  Allergies  Meds  Problems  Med Hx  Surg Hx  Fam Hx           Objective   Soft Tissue Exam  No findings documented this visit      Dental Exam    Lower flexible partial broke    Assessment & Plan   Problem List Items Addressed This Visit    None  Visit Diagnoses         Encounter for dental examination    -  Primary    Relevant Orders    PRELIMINARY IMPRESSIONS (Completed)        Lower flexible partial broke. Patient is interested in new lower partial Once financials are agreed upon, we will fabricate new lower flexible partial.    NV: Once financials are agreed upon, upper alginate with existing partial, bit and shade selection

## 2025-06-23 NOTE — PROGRESS NOTES
Procedure Details  [id number] - PRELIMINARY IMPRESSIONS  Dental procedures in this visit    [id number] - PRELIMINARY IMPRESSIONS (Completed)     Service provider: Nicholas Anderson DMD     Billing provider: Nicholas Anderson DMD     Subjective   Patient ID: Clyde Kruse Jr. is a 59 y.o. male.  No chief complaint on file.    HPI  The following portions of the chart were reviewed this encounter and updated as appropriate:    Tobacco  Allergies  Meds  Problems  Med Hx  Surg Hx  Fam Hx           Objective   Soft Tissue Exam  No findings documented this visit      Dental Exam    Lower flexible partial broke    Assessment & Plan   Problem List Items Addressed This Visit    None  Visit Diagnoses         Encounter for dental examination    -  Primary    Relevant Orders    PRELIMINARY IMPRESSIONS (Completed)        Lower flexible partial abhijit. Patient is interested in new lower partial Once financials are agreed upon, we will fabricate new lower flexible partial.    NV: Once financials are agreed upon, upper alginate with existing partial, bit and shade selection